# Patient Record
Sex: FEMALE | Race: BLACK OR AFRICAN AMERICAN | NOT HISPANIC OR LATINO | URBAN - METROPOLITAN AREA
[De-identification: names, ages, dates, MRNs, and addresses within clinical notes are randomized per-mention and may not be internally consistent; named-entity substitution may affect disease eponyms.]

---

## 2019-01-01 ENCOUNTER — INPATIENT (INPATIENT)
Facility: HOSPITAL | Age: 84
LOS: 3 days | Discharge: HOSPICE | DRG: 208 | End: 2019-06-11
Attending: HOSPITALIST | Admitting: INTERNAL MEDICINE
Payer: MEDICARE

## 2019-01-01 ENCOUNTER — INPATIENT (INPATIENT)
Facility: HOSPITAL | Age: 84
LOS: 3 days | DRG: 640 | End: 2019-07-04
Attending: INTERNAL MEDICINE | Admitting: HOSPITALIST
Payer: OTHER MISCELLANEOUS

## 2019-01-01 VITALS
RESPIRATION RATE: 18 BRPM | SYSTOLIC BLOOD PRESSURE: 111 MMHG | OXYGEN SATURATION: 92 % | TEMPERATURE: 98 F | HEART RATE: 88 BPM | DIASTOLIC BLOOD PRESSURE: 73 MMHG

## 2019-01-01 VITALS
DIASTOLIC BLOOD PRESSURE: 70 MMHG | HEART RATE: 60 BPM | OXYGEN SATURATION: 100 % | RESPIRATION RATE: 18 BRPM | TEMPERATURE: 98 F | SYSTOLIC BLOOD PRESSURE: 110 MMHG

## 2019-01-01 VITALS
RESPIRATION RATE: 32 BRPM | OXYGEN SATURATION: 60 % | HEART RATE: 67 BPM | SYSTOLIC BLOOD PRESSURE: 105 MMHG | DIASTOLIC BLOOD PRESSURE: 59 MMHG

## 2019-01-01 VITALS
DIASTOLIC BLOOD PRESSURE: 54 MMHG | TEMPERATURE: 98 F | SYSTOLIC BLOOD PRESSURE: 96 MMHG | RESPIRATION RATE: 12 BRPM | HEART RATE: 83 BPM

## 2019-01-01 DIAGNOSIS — J96.01 ACUTE RESPIRATORY FAILURE WITH HYPOXIA: ICD-10-CM

## 2019-01-01 DIAGNOSIS — Z86.73 PERSONAL HISTORY OF TRANSIENT ISCHEMIC ATTACK (TIA), AND CEREBRAL INFARCTION WITHOUT RESIDUAL DEFICITS: ICD-10-CM

## 2019-01-01 DIAGNOSIS — R64 CACHEXIA: ICD-10-CM

## 2019-01-01 DIAGNOSIS — E87.0 HYPEROSMOLALITY AND HYPERNATREMIA: ICD-10-CM

## 2019-01-01 DIAGNOSIS — Z71.89 OTHER SPECIFIED COUNSELING: ICD-10-CM

## 2019-01-01 DIAGNOSIS — J69.0 PNEUMONITIS DUE TO INHALATION OF FOOD AND VOMIT: ICD-10-CM

## 2019-01-01 DIAGNOSIS — R13.10 DYSPHAGIA, UNSPECIFIED: ICD-10-CM

## 2019-01-01 DIAGNOSIS — J18.9 PNEUMONIA, UNSPECIFIED ORGANISM: ICD-10-CM

## 2019-01-01 DIAGNOSIS — I95.9 HYPOTENSION, UNSPECIFIED: ICD-10-CM

## 2019-01-01 DIAGNOSIS — N17.9 ACUTE KIDNEY FAILURE, UNSPECIFIED: ICD-10-CM

## 2019-01-01 LAB
-  AMIKACIN: SIGNIFICANT CHANGE UP
-  AMPICILLIN/SULBACTAM: SIGNIFICANT CHANGE UP
-  AMPICILLIN: SIGNIFICANT CHANGE UP
-  AZTREONAM: SIGNIFICANT CHANGE UP
-  CEFAZOLIN: SIGNIFICANT CHANGE UP
-  CEFEPIME: SIGNIFICANT CHANGE UP
-  CEFOXITIN: SIGNIFICANT CHANGE UP
-  CEFTRIAXONE: SIGNIFICANT CHANGE UP
-  CIPROFLOXACIN: SIGNIFICANT CHANGE UP
-  ERTAPENEM: SIGNIFICANT CHANGE UP
-  GENTAMICIN: SIGNIFICANT CHANGE UP
-  IMIPENEM: SIGNIFICANT CHANGE UP
-  LEVOFLOXACIN: SIGNIFICANT CHANGE UP
-  MEROPENEM: SIGNIFICANT CHANGE UP
-  PIPERACILLIN/TAZOBACTAM: SIGNIFICANT CHANGE UP
-  TOBRAMYCIN: SIGNIFICANT CHANGE UP
-  TRIMETHOPRIM/SULFAMETHOXAZOLE: SIGNIFICANT CHANGE UP
ALBUMIN SERPL ELPH-MCNC: 2.2 G/DL — LOW (ref 3.3–5.2)
ALBUMIN SERPL ELPH-MCNC: 2.3 G/DL — LOW (ref 3.3–5.2)
ALBUMIN SERPL ELPH-MCNC: 3.3 G/DL — SIGNIFICANT CHANGE UP (ref 3.3–5.2)
ALP SERPL-CCNC: 52 U/L — SIGNIFICANT CHANGE UP (ref 40–120)
ALP SERPL-CCNC: 72 U/L — SIGNIFICANT CHANGE UP (ref 40–120)
ALP SERPL-CCNC: 85 U/L — SIGNIFICANT CHANGE UP (ref 40–120)
ALT FLD-CCNC: 11 U/L — SIGNIFICANT CHANGE UP
ALT FLD-CCNC: 11 U/L — SIGNIFICANT CHANGE UP
ALT FLD-CCNC: 7 U/L — SIGNIFICANT CHANGE UP
ANION GAP SERPL CALC-SCNC: 12 MMOL/L — SIGNIFICANT CHANGE UP (ref 5–17)
ANION GAP SERPL CALC-SCNC: 13 MMOL/L — SIGNIFICANT CHANGE UP (ref 5–17)
ANION GAP SERPL CALC-SCNC: 16 MMOL/L — SIGNIFICANT CHANGE UP (ref 5–17)
ANION GAP SERPL CALC-SCNC: 17 MMOL/L — SIGNIFICANT CHANGE UP (ref 5–17)
ANION GAP SERPL CALC-SCNC: 17 MMOL/L — SIGNIFICANT CHANGE UP (ref 5–17)
ANION GAP SERPL CALC-SCNC: 23 MMOL/L — HIGH (ref 5–17)
APTT BLD: 23.5 SEC — LOW (ref 27.5–36.3)
AST SERPL-CCNC: 16 U/L — SIGNIFICANT CHANGE UP
AST SERPL-CCNC: 20 U/L — SIGNIFICANT CHANGE UP
AST SERPL-CCNC: 20 U/L — SIGNIFICANT CHANGE UP
BASOPHILS # BLD AUTO: 0 K/UL — SIGNIFICANT CHANGE UP (ref 0–0.2)
BASOPHILS NFR BLD AUTO: 0.1 % — SIGNIFICANT CHANGE UP (ref 0–2)
BILIRUB SERPL-MCNC: 0.5 MG/DL — SIGNIFICANT CHANGE UP (ref 0.4–2)
BILIRUB SERPL-MCNC: 0.7 MG/DL — SIGNIFICANT CHANGE UP (ref 0.4–2)
BILIRUB SERPL-MCNC: 1.1 MG/DL — SIGNIFICANT CHANGE UP (ref 0.4–2)
BUN SERPL-MCNC: 17 MG/DL — SIGNIFICANT CHANGE UP (ref 8–20)
BUN SERPL-MCNC: 17 MG/DL — SIGNIFICANT CHANGE UP (ref 8–20)
BUN SERPL-MCNC: 19 MG/DL — SIGNIFICANT CHANGE UP (ref 8–20)
BUN SERPL-MCNC: 29 MG/DL — HIGH (ref 8–20)
BUN SERPL-MCNC: 30 MG/DL — HIGH (ref 8–20)
BUN SERPL-MCNC: 34 MG/DL — HIGH (ref 8–20)
BUN SERPL-MCNC: 36 MG/DL — HIGH (ref 8–20)
BUN SERPL-MCNC: 44 MG/DL — HIGH (ref 8–20)
CALCIUM SERPL-MCNC: 6.9 MG/DL — LOW (ref 8.6–10.2)
CALCIUM SERPL-MCNC: 6.9 MG/DL — LOW (ref 8.6–10.2)
CALCIUM SERPL-MCNC: 7.1 MG/DL — LOW (ref 8.6–10.2)
CALCIUM SERPL-MCNC: 7.4 MG/DL — LOW (ref 8.6–10.2)
CALCIUM SERPL-MCNC: 7.4 MG/DL — LOW (ref 8.6–10.2)
CALCIUM SERPL-MCNC: 7.6 MG/DL — LOW (ref 8.6–10.2)
CALCIUM SERPL-MCNC: 7.8 MG/DL — LOW (ref 8.6–10.2)
CALCIUM SERPL-MCNC: 9 MG/DL — SIGNIFICANT CHANGE UP (ref 8.6–10.2)
CHLORIDE SERPL-SCNC: 104 MMOL/L — SIGNIFICANT CHANGE UP (ref 98–107)
CHLORIDE SERPL-SCNC: 106 MMOL/L — SIGNIFICANT CHANGE UP (ref 98–107)
CHLORIDE SERPL-SCNC: 109 MMOL/L — HIGH (ref 98–107)
CHLORIDE SERPL-SCNC: 110 MMOL/L — HIGH (ref 98–107)
CHLORIDE SERPL-SCNC: 112 MMOL/L — HIGH (ref 98–107)
CHLORIDE SERPL-SCNC: 112 MMOL/L — HIGH (ref 98–107)
CHLORIDE SERPL-SCNC: 114 MMOL/L — HIGH (ref 98–107)
CHLORIDE SERPL-SCNC: 118 MMOL/L — HIGH (ref 98–107)
CK SERPL-CCNC: 91 U/L — SIGNIFICANT CHANGE UP (ref 25–170)
CO2 SERPL-SCNC: 22 MMOL/L — SIGNIFICANT CHANGE UP (ref 22–29)
CO2 SERPL-SCNC: 23 MMOL/L — SIGNIFICANT CHANGE UP (ref 22–29)
CO2 SERPL-SCNC: 24 MMOL/L — SIGNIFICANT CHANGE UP (ref 22–29)
CO2 SERPL-SCNC: 24 MMOL/L — SIGNIFICANT CHANGE UP (ref 22–29)
CO2 SERPL-SCNC: 25 MMOL/L — SIGNIFICANT CHANGE UP (ref 22–29)
CO2 SERPL-SCNC: 27 MMOL/L — SIGNIFICANT CHANGE UP (ref 22–29)
CO2 SERPL-SCNC: 30 MMOL/L — HIGH (ref 22–29)
CO2 SERPL-SCNC: 31 MMOL/L — HIGH (ref 22–29)
CREAT SERPL-MCNC: 0.87 MG/DL — SIGNIFICANT CHANGE UP (ref 0.5–1.3)
CREAT SERPL-MCNC: 0.89 MG/DL — SIGNIFICANT CHANGE UP (ref 0.5–1.3)
CREAT SERPL-MCNC: 0.91 MG/DL — SIGNIFICANT CHANGE UP (ref 0.5–1.3)
CREAT SERPL-MCNC: 1.1 MG/DL — SIGNIFICANT CHANGE UP (ref 0.5–1.3)
CREAT SERPL-MCNC: 1.21 MG/DL — SIGNIFICANT CHANGE UP (ref 0.5–1.3)
CREAT SERPL-MCNC: 1.4 MG/DL — HIGH (ref 0.5–1.3)
CREAT SERPL-MCNC: 1.43 MG/DL — HIGH (ref 0.5–1.3)
CREAT SERPL-MCNC: 1.9 MG/DL — HIGH (ref 0.5–1.3)
CULTURE RESULTS: SIGNIFICANT CHANGE UP
EOSINOPHIL # BLD AUTO: 0 K/UL — SIGNIFICANT CHANGE UP (ref 0–0.5)
EOSINOPHIL NFR BLD AUTO: 0 % — SIGNIFICANT CHANGE UP (ref 0–6)
GAS PNL BLDA: SIGNIFICANT CHANGE UP
GAS PNL BLDV: SIGNIFICANT CHANGE UP
GLUCOSE BLDC GLUCOMTR-MCNC: 110 MG/DL — HIGH (ref 70–99)
GLUCOSE SERPL-MCNC: 130 MG/DL — HIGH (ref 70–115)
GLUCOSE SERPL-MCNC: 132 MG/DL — HIGH (ref 70–115)
GLUCOSE SERPL-MCNC: 133 MG/DL — HIGH (ref 70–115)
GLUCOSE SERPL-MCNC: 136 MG/DL — HIGH (ref 70–115)
GLUCOSE SERPL-MCNC: 160 MG/DL — HIGH (ref 70–115)
GLUCOSE SERPL-MCNC: 177 MG/DL — HIGH (ref 70–115)
GLUCOSE SERPL-MCNC: 217 MG/DL — HIGH (ref 70–115)
GLUCOSE SERPL-MCNC: 76 MG/DL — SIGNIFICANT CHANGE UP (ref 70–115)
GRAM STN FLD: SIGNIFICANT CHANGE UP
HCT VFR BLD CALC: 29.3 % — LOW (ref 37–47)
HCT VFR BLD CALC: 30.2 % — LOW (ref 37–47)
HCT VFR BLD CALC: 31.1 % — LOW (ref 34.5–45)
HCT VFR BLD CALC: 33.8 % — LOW (ref 34.5–45)
HCT VFR BLD CALC: 41.5 % — SIGNIFICANT CHANGE UP (ref 37–47)
HGB BLD-MCNC: 10.4 G/DL — LOW (ref 11.5–15.5)
HGB BLD-MCNC: 12.9 G/DL — SIGNIFICANT CHANGE UP (ref 12–16)
HGB BLD-MCNC: 9.5 G/DL — LOW (ref 12–16)
HGB BLD-MCNC: 9.7 G/DL — LOW (ref 12–16)
HGB BLD-MCNC: 9.9 G/DL — LOW (ref 11.5–15.5)
INR BLD: 1.12 RATIO — SIGNIFICANT CHANGE UP (ref 0.88–1.16)
LACTATE BLDV-MCNC: 3.3 MMOL/L — HIGH (ref 0.5–2)
LACTATE SERPL-SCNC: 3.6 MMOL/L — HIGH (ref 0.5–2)
LYMPHOCYTES # BLD AUTO: 1.3 K/UL — SIGNIFICANT CHANGE UP (ref 1–4.8)
LYMPHOCYTES # BLD AUTO: 11.4 % — LOW (ref 20–55)
MAGNESIUM SERPL-MCNC: 1.7 MG/DL — SIGNIFICANT CHANGE UP (ref 1.6–2.6)
MAGNESIUM SERPL-MCNC: 1.9 MG/DL — SIGNIFICANT CHANGE UP (ref 1.6–2.6)
MAGNESIUM SERPL-MCNC: 2 MG/DL — SIGNIFICANT CHANGE UP (ref 1.6–2.6)
MAGNESIUM SERPL-MCNC: 2 MG/DL — SIGNIFICANT CHANGE UP (ref 1.6–2.6)
MAGNESIUM SERPL-MCNC: 2 MG/DL — SIGNIFICANT CHANGE UP (ref 1.8–2.6)
MCHC RBC-ENTMCNC: 30.1 PG — SIGNIFICANT CHANGE UP (ref 27–31)
MCHC RBC-ENTMCNC: 30.2 PG — SIGNIFICANT CHANGE UP (ref 27–31)
MCHC RBC-ENTMCNC: 30.3 PG — SIGNIFICANT CHANGE UP (ref 27–31)
MCHC RBC-ENTMCNC: 30.3 PG — SIGNIFICANT CHANGE UP (ref 27–34)
MCHC RBC-ENTMCNC: 30.8 GM/DL — LOW (ref 32–36)
MCHC RBC-ENTMCNC: 30.8 PG — SIGNIFICANT CHANGE UP (ref 27–34)
MCHC RBC-ENTMCNC: 31.1 G/DL — LOW (ref 32–36)
MCHC RBC-ENTMCNC: 31.8 GM/DL — LOW (ref 32–36)
MCHC RBC-ENTMCNC: 32.1 G/DL — SIGNIFICANT CHANGE UP (ref 32–36)
MCHC RBC-ENTMCNC: 32.4 G/DL — SIGNIFICANT CHANGE UP (ref 32–36)
MCV RBC AUTO: 93 FL — SIGNIFICANT CHANGE UP (ref 81–99)
MCV RBC AUTO: 93.8 FL — SIGNIFICANT CHANGE UP (ref 81–99)
MCV RBC AUTO: 96.9 FL — SIGNIFICANT CHANGE UP (ref 80–100)
MCV RBC AUTO: 97.4 FL — SIGNIFICANT CHANGE UP (ref 81–99)
MCV RBC AUTO: 98.5 FL — SIGNIFICANT CHANGE UP (ref 80–100)
METHOD TYPE: SIGNIFICANT CHANGE UP
MONOCYTES # BLD AUTO: 1.1 K/UL — HIGH (ref 0–0.8)
MONOCYTES NFR BLD AUTO: 9 % — SIGNIFICANT CHANGE UP (ref 3–10)
NEUTROPHILS # BLD AUTO: 9.4 K/UL — HIGH (ref 1.8–8)
NEUTROPHILS NFR BLD AUTO: 79.2 % — HIGH (ref 37–73)
ORGANISM # SPEC MICROSCOPIC CNT: SIGNIFICANT CHANGE UP
ORGANISM # SPEC MICROSCOPIC CNT: SIGNIFICANT CHANGE UP
PHOSPHATE SERPL-MCNC: 0.8 MG/DL — CRITICAL LOW (ref 2.4–4.7)
PHOSPHATE SERPL-MCNC: 1.1 MG/DL — LOW (ref 2.4–4.7)
PHOSPHATE SERPL-MCNC: 4.5 MG/DL — SIGNIFICANT CHANGE UP (ref 2.4–4.7)
PLATELET # BLD AUTO: 174 K/UL — SIGNIFICANT CHANGE UP (ref 150–400)
PLATELET # BLD AUTO: 180 K/UL — SIGNIFICANT CHANGE UP (ref 150–400)
PLATELET # BLD AUTO: 196 K/UL — SIGNIFICANT CHANGE UP (ref 150–400)
PLATELET # BLD AUTO: 217 K/UL — SIGNIFICANT CHANGE UP (ref 150–400)
PLATELET # BLD AUTO: 244 K/UL — SIGNIFICANT CHANGE UP (ref 150–400)
POTASSIUM SERPL-MCNC: 2.7 MMOL/L — CRITICAL LOW (ref 3.5–5.3)
POTASSIUM SERPL-MCNC: 2.7 MMOL/L — CRITICAL LOW (ref 3.5–5.3)
POTASSIUM SERPL-MCNC: 2.8 MMOL/L — CRITICAL LOW (ref 3.5–5.3)
POTASSIUM SERPL-MCNC: 3.1 MMOL/L — LOW (ref 3.5–5.3)
POTASSIUM SERPL-MCNC: 3.2 MMOL/L — LOW (ref 3.5–5.3)
POTASSIUM SERPL-MCNC: 3.4 MMOL/L — LOW (ref 3.5–5.3)
POTASSIUM SERPL-MCNC: 3.6 MMOL/L — SIGNIFICANT CHANGE UP (ref 3.5–5.3)
POTASSIUM SERPL-MCNC: 4 MMOL/L — SIGNIFICANT CHANGE UP (ref 3.5–5.3)
POTASSIUM SERPL-SCNC: 2.7 MMOL/L — CRITICAL LOW (ref 3.5–5.3)
POTASSIUM SERPL-SCNC: 2.7 MMOL/L — CRITICAL LOW (ref 3.5–5.3)
POTASSIUM SERPL-SCNC: 2.8 MMOL/L — CRITICAL LOW (ref 3.5–5.3)
POTASSIUM SERPL-SCNC: 3.1 MMOL/L — LOW (ref 3.5–5.3)
POTASSIUM SERPL-SCNC: 3.2 MMOL/L — LOW (ref 3.5–5.3)
POTASSIUM SERPL-SCNC: 3.4 MMOL/L — LOW (ref 3.5–5.3)
POTASSIUM SERPL-SCNC: 3.6 MMOL/L — SIGNIFICANT CHANGE UP (ref 3.5–5.3)
POTASSIUM SERPL-SCNC: 4 MMOL/L — SIGNIFICANT CHANGE UP (ref 3.5–5.3)
PROT SERPL-MCNC: 4.5 G/DL — LOW (ref 6.6–8.7)
PROT SERPL-MCNC: 5.8 G/DL — LOW (ref 6.6–8.7)
PROT SERPL-MCNC: 6.4 G/DL — LOW (ref 6.6–8.7)
PROTHROM AB SERPL-ACNC: 12.9 SEC — SIGNIFICANT CHANGE UP (ref 10–12.9)
RBC # BLD: 3.15 M/UL — LOW (ref 4.4–5.2)
RBC # BLD: 3.21 M/UL — LOW (ref 3.8–5.2)
RBC # BLD: 3.22 M/UL — LOW (ref 4.4–5.2)
RBC # BLD: 3.43 M/UL — LOW (ref 3.8–5.2)
RBC # BLD: 4.26 M/UL — LOW (ref 4.4–5.2)
RBC # FLD: 16.6 % — HIGH (ref 11–15.6)
RBC # FLD: 16.9 % — HIGH (ref 11–15.6)
RBC # FLD: 17.1 % — HIGH (ref 10.3–14.5)
RBC # FLD: 17.4 % — HIGH (ref 11–15.6)
RBC # FLD: 17.7 % — HIGH (ref 10.3–14.5)
SODIUM SERPL-SCNC: 144 MMOL/L — SIGNIFICANT CHANGE UP (ref 135–145)
SODIUM SERPL-SCNC: 144 MMOL/L — SIGNIFICANT CHANGE UP (ref 135–145)
SODIUM SERPL-SCNC: 150 MMOL/L — HIGH (ref 135–145)
SODIUM SERPL-SCNC: 151 MMOL/L — HIGH (ref 135–145)
SODIUM SERPL-SCNC: 155 MMOL/L — HIGH (ref 135–145)
SODIUM SERPL-SCNC: 157 MMOL/L — HIGH (ref 135–145)
SODIUM SERPL-SCNC: 158 MMOL/L — HIGH (ref 135–145)
SODIUM SERPL-SCNC: 162 MMOL/L — CRITICAL HIGH (ref 135–145)
SPECIMEN SOURCE: SIGNIFICANT CHANGE UP
TROPONIN T SERPL-MCNC: 0.08 NG/ML — HIGH (ref 0–0.06)
TROPONIN T SERPL-MCNC: 0.09 NG/ML — HIGH (ref 0–0.06)
WBC # BLD: 10.7 K/UL — SIGNIFICANT CHANGE UP (ref 4.8–10.8)
WBC # BLD: 11.8 K/UL — HIGH (ref 4.8–10.8)
WBC # BLD: 12.17 K/UL — HIGH (ref 3.8–10.5)
WBC # BLD: 13.36 K/UL — HIGH (ref 3.8–10.5)
WBC # BLD: 7.6 K/UL — SIGNIFICANT CHANGE UP (ref 4.8–10.8)
WBC # FLD AUTO: 10.7 K/UL — SIGNIFICANT CHANGE UP (ref 4.8–10.8)
WBC # FLD AUTO: 11.8 K/UL — HIGH (ref 4.8–10.8)
WBC # FLD AUTO: 12.17 K/UL — HIGH (ref 3.8–10.5)
WBC # FLD AUTO: 13.36 K/UL — HIGH (ref 3.8–10.5)
WBC # FLD AUTO: 7.6 K/UL — SIGNIFICANT CHANGE UP (ref 4.8–10.8)

## 2019-01-01 PROCEDURE — 70450 CT HEAD/BRAIN W/O DYE: CPT | Mod: 26

## 2019-01-01 PROCEDURE — 92526 ORAL FUNCTION THERAPY: CPT

## 2019-01-01 PROCEDURE — 85730 THROMBOPLASTIN TIME PARTIAL: CPT

## 2019-01-01 PROCEDURE — 71045 X-RAY EXAM CHEST 1 VIEW: CPT | Mod: 26

## 2019-01-01 PROCEDURE — 84100 ASSAY OF PHOSPHORUS: CPT

## 2019-01-01 PROCEDURE — 82962 GLUCOSE BLOOD TEST: CPT

## 2019-01-01 PROCEDURE — 85014 HEMATOCRIT: CPT

## 2019-01-01 PROCEDURE — 36415 COLL VENOUS BLD VENIPUNCTURE: CPT

## 2019-01-01 PROCEDURE — 82330 ASSAY OF CALCIUM: CPT

## 2019-01-01 PROCEDURE — 82803 BLOOD GASES ANY COMBINATION: CPT

## 2019-01-01 PROCEDURE — 82435 ASSAY OF BLOOD CHLORIDE: CPT

## 2019-01-01 PROCEDURE — 99233 SBSQ HOSP IP/OBS HIGH 50: CPT

## 2019-01-01 PROCEDURE — 43753 TX GASTRO INTUB W/ASP: CPT

## 2019-01-01 PROCEDURE — 93010 ELECTROCARDIOGRAM REPORT: CPT

## 2019-01-01 PROCEDURE — 85027 COMPLETE CBC AUTOMATED: CPT

## 2019-01-01 PROCEDURE — 87070 CULTURE OTHR SPECIMN AEROBIC: CPT

## 2019-01-01 PROCEDURE — 99291 CRITICAL CARE FIRST HOUR: CPT | Mod: 25

## 2019-01-01 PROCEDURE — 94003 VENT MGMT INPAT SUBQ DAY: CPT

## 2019-01-01 PROCEDURE — 80053 COMPREHEN METABOLIC PANEL: CPT

## 2019-01-01 PROCEDURE — 99232 SBSQ HOSP IP/OBS MODERATE 35: CPT

## 2019-01-01 PROCEDURE — 82550 ASSAY OF CK (CPK): CPT

## 2019-01-01 PROCEDURE — 93005 ELECTROCARDIOGRAM TRACING: CPT

## 2019-01-01 PROCEDURE — 71045 X-RAY EXAM CHEST 1 VIEW: CPT

## 2019-01-01 PROCEDURE — 96365 THER/PROPH/DIAG IV INF INIT: CPT | Mod: XU

## 2019-01-01 PROCEDURE — 96374 THER/PROPH/DIAG INJ IV PUSH: CPT

## 2019-01-01 PROCEDURE — 99223 1ST HOSP IP/OBS HIGH 75: CPT

## 2019-01-01 PROCEDURE — 99497 ADVNCD CARE PLAN 30 MIN: CPT | Mod: GC

## 2019-01-01 PROCEDURE — 84484 ASSAY OF TROPONIN QUANT: CPT

## 2019-01-01 PROCEDURE — 99285 EMERGENCY DEPT VISIT HI MDM: CPT | Mod: 25

## 2019-01-01 PROCEDURE — 99232 SBSQ HOSP IP/OBS MODERATE 35: CPT | Mod: GC

## 2019-01-01 PROCEDURE — 31500 INSERT EMERGENCY AIRWAY: CPT

## 2019-01-01 PROCEDURE — 84132 ASSAY OF SERUM POTASSIUM: CPT

## 2019-01-01 PROCEDURE — 96376 TX/PRO/DX INJ SAME DRUG ADON: CPT

## 2019-01-01 PROCEDURE — 82947 ASSAY GLUCOSE BLOOD QUANT: CPT

## 2019-01-01 PROCEDURE — 99291 CRITICAL CARE FIRST HOUR: CPT

## 2019-01-01 PROCEDURE — 83735 ASSAY OF MAGNESIUM: CPT

## 2019-01-01 PROCEDURE — 70450 CT HEAD/BRAIN W/O DYE: CPT

## 2019-01-01 PROCEDURE — 84295 ASSAY OF SERUM SODIUM: CPT

## 2019-01-01 PROCEDURE — 99239 HOSP IP/OBS DSCHRG MGMT >30: CPT

## 2019-01-01 PROCEDURE — 80048 BASIC METABOLIC PNL TOTAL CA: CPT

## 2019-01-01 PROCEDURE — 85610 PROTHROMBIN TIME: CPT

## 2019-01-01 PROCEDURE — 12345: CPT | Mod: NC

## 2019-01-01 PROCEDURE — 99285 EMERGENCY DEPT VISIT HI MDM: CPT

## 2019-01-01 PROCEDURE — 87186 SC STD MICRODIL/AGAR DIL: CPT

## 2019-01-01 PROCEDURE — 94002 VENT MGMT INPAT INIT DAY: CPT

## 2019-01-01 PROCEDURE — 96375 TX/PRO/DX INJ NEW DRUG ADDON: CPT

## 2019-01-01 PROCEDURE — 96375 TX/PRO/DX INJ NEW DRUG ADDON: CPT | Mod: XU

## 2019-01-01 PROCEDURE — 99238 HOSP IP/OBS DSCHRG MGMT 30/<: CPT

## 2019-01-01 PROCEDURE — 94760 N-INVAS EAR/PLS OXIMETRY 1: CPT

## 2019-01-01 PROCEDURE — 83605 ASSAY OF LACTIC ACID: CPT

## 2019-01-01 PROCEDURE — 92610 EVALUATE SWALLOWING FUNCTION: CPT

## 2019-01-01 PROCEDURE — 94770: CPT

## 2019-01-01 PROCEDURE — 99053 MED SERV 10PM-8AM 24 HR FAC: CPT

## 2019-01-01 PROCEDURE — 87040 BLOOD CULTURE FOR BACTERIA: CPT

## 2019-01-01 PROCEDURE — 96368 THER/DIAG CONCURRENT INF: CPT

## 2019-01-01 RX ORDER — ROBINUL 0.2 MG/ML
2.5 INJECTION INTRAMUSCULAR; INTRAVENOUS
Qty: 225 | Refills: 0
Start: 2019-01-01 | End: 2019-07-30

## 2019-01-01 RX ORDER — AMLODIPINE BESYLATE 2.5 MG/1
1 TABLET ORAL
Qty: 0 | Refills: 0 | DISCHARGE

## 2019-01-01 RX ORDER — ASPIRIN/CALCIUM CARB/MAGNESIUM 324 MG
1 TABLET ORAL
Qty: 0 | Refills: 0 | DISCHARGE

## 2019-01-01 RX ORDER — SODIUM CHLORIDE 9 MG/ML
1000 INJECTION, SOLUTION INTRAVENOUS ONCE
Refills: 0 | Status: COMPLETED | OUTPATIENT
Start: 2019-01-01 | End: 2019-01-01

## 2019-01-01 RX ORDER — MORPHINE SULFATE 50 MG/1
2.5 CAPSULE, EXTENDED RELEASE ORAL EVERY 8 HOURS
Refills: 0 | Status: DISCONTINUED | OUTPATIENT
Start: 2019-01-01 | End: 2019-01-01

## 2019-01-01 RX ORDER — PIPERACILLIN AND TAZOBACTAM 4; .5 G/20ML; G/20ML
3.38 INJECTION, POWDER, LYOPHILIZED, FOR SOLUTION INTRAVENOUS
Qty: 0 | Refills: 0 | DISCHARGE
Start: 2019-01-01

## 2019-01-01 RX ORDER — POTASSIUM CHLORIDE 20 MEQ
10 PACKET (EA) ORAL ONCE
Refills: 0 | Status: COMPLETED | OUTPATIENT
Start: 2019-01-01 | End: 2019-01-01

## 2019-01-01 RX ORDER — POTASSIUM PHOSPHATE, MONOBASIC POTASSIUM PHOSPHATE, DIBASIC 236; 224 MG/ML; MG/ML
30 INJECTION, SOLUTION INTRAVENOUS ONCE
Refills: 0 | Status: COMPLETED | OUTPATIENT
Start: 2019-01-01 | End: 2019-01-01

## 2019-01-01 RX ORDER — ASPIRIN/CALCIUM CARB/MAGNESIUM 324 MG
81 TABLET ORAL DAILY
Refills: 0 | Status: DISCONTINUED | OUTPATIENT
Start: 2019-01-01 | End: 2019-01-01

## 2019-01-01 RX ORDER — VANCOMYCIN HCL 1 G
1000 VIAL (EA) INTRAVENOUS ONCE
Refills: 0 | Status: COMPLETED | OUTPATIENT
Start: 2019-01-01 | End: 2019-01-01

## 2019-01-01 RX ORDER — PIPERACILLIN AND TAZOBACTAM 4; .5 G/20ML; G/20ML
3.38 INJECTION, POWDER, LYOPHILIZED, FOR SOLUTION INTRAVENOUS ONCE
Refills: 0 | Status: COMPLETED | OUTPATIENT
Start: 2019-01-01 | End: 2019-01-01

## 2019-01-01 RX ORDER — MORPHINE SULFATE 50 MG/1
2 CAPSULE, EXTENDED RELEASE ORAL
Refills: 0 | Status: DISCONTINUED | OUTPATIENT
Start: 2019-01-01 | End: 2019-01-01

## 2019-01-01 RX ORDER — HEPARIN SODIUM 5000 [USP'U]/ML
5000 INJECTION INTRAVENOUS; SUBCUTANEOUS EVERY 8 HOURS
Refills: 0 | Status: DISCONTINUED | OUTPATIENT
Start: 2019-01-01 | End: 2019-01-01

## 2019-01-01 RX ORDER — ACETAMINOPHEN 500 MG
1 TABLET ORAL
Qty: 0 | Refills: 0 | DISCHARGE
Start: 2019-01-01

## 2019-01-01 RX ORDER — DEXTROSE MONOHYDRATE, SODIUM CHLORIDE, AND POTASSIUM CHLORIDE 50; .745; 4.5 G/1000ML; G/1000ML; G/1000ML
1000 INJECTION, SOLUTION INTRAVENOUS
Refills: 0 | Status: DISCONTINUED | OUTPATIENT
Start: 2019-01-01 | End: 2019-01-01

## 2019-01-01 RX ORDER — PROPOFOL 10 MG/ML
10 INJECTION, EMULSION INTRAVENOUS
Qty: 1000 | Refills: 0 | Status: DISCONTINUED | OUTPATIENT
Start: 2019-01-01 | End: 2019-01-01

## 2019-01-01 RX ORDER — CHLORHEXIDINE GLUCONATE 213 G/1000ML
1 SOLUTION TOPICAL
Refills: 0 | Status: DISCONTINUED | OUTPATIENT
Start: 2019-01-01 | End: 2019-01-01

## 2019-01-01 RX ORDER — LEVOTHYROXINE SODIUM 125 MCG
12.5 TABLET ORAL
Qty: 0 | Refills: 0 | DISCHARGE
Start: 2019-01-01

## 2019-01-01 RX ORDER — SCOPALAMINE 1 MG/3D
1 PATCH, EXTENDED RELEASE TRANSDERMAL
Refills: 0 | Status: DISCONTINUED | OUTPATIENT
Start: 2019-01-01 | End: 2019-01-01

## 2019-01-01 RX ORDER — LEVOTHYROXINE SODIUM 125 MCG
12.5 TABLET ORAL AT BEDTIME
Refills: 0 | Status: DISCONTINUED | OUTPATIENT
Start: 2019-01-01 | End: 2019-01-01

## 2019-01-01 RX ORDER — MORPHINE SULFATE 50 MG/1
0.25 CAPSULE, EXTENDED RELEASE ORAL
Qty: 22.5 | Refills: 0
Start: 2019-01-01 | End: 2019-07-30

## 2019-01-01 RX ORDER — SODIUM CHLORIDE 9 MG/ML
1000 INJECTION, SOLUTION INTRAVENOUS
Refills: 0 | Status: DISCONTINUED | OUTPATIENT
Start: 2019-01-01 | End: 2019-01-01

## 2019-01-01 RX ORDER — PIPERACILLIN AND TAZOBACTAM 4; .5 G/20ML; G/20ML
3.38 INJECTION, POWDER, LYOPHILIZED, FOR SOLUTION INTRAVENOUS EVERY 12 HOURS
Refills: 0 | Status: DISCONTINUED | OUTPATIENT
Start: 2019-01-01 | End: 2019-01-01

## 2019-01-01 RX ORDER — ACETAMINOPHEN 500 MG
325 TABLET ORAL EVERY 6 HOURS
Refills: 0 | Status: DISCONTINUED | OUTPATIENT
Start: 2019-01-01 | End: 2019-01-01

## 2019-01-01 RX ORDER — POTASSIUM PHOSPHATE, MONOBASIC POTASSIUM PHOSPHATE, DIBASIC 236; 224 MG/ML; MG/ML
30 INJECTION, SOLUTION INTRAVENOUS EVERY 12 HOURS
Refills: 0 | Status: COMPLETED | OUTPATIENT
Start: 2019-01-01 | End: 2019-01-01

## 2019-01-01 RX ORDER — MORPHINE SULFATE 50 MG/1
2 CAPSULE, EXTENDED RELEASE ORAL EVERY 4 HOURS
Refills: 0 | Status: DISCONTINUED | OUTPATIENT
Start: 2019-01-01 | End: 2019-01-01

## 2019-01-01 RX ORDER — ROBINUL 0.2 MG/ML
0.4 INJECTION INTRAMUSCULAR; INTRAVENOUS EVERY 8 HOURS
Refills: 0 | Status: DISCONTINUED | OUTPATIENT
Start: 2019-01-01 | End: 2019-01-01

## 2019-01-01 RX ORDER — ETOMIDATE 2 MG/ML
20 INJECTION INTRAVENOUS ONCE
Refills: 0 | Status: COMPLETED | OUTPATIENT
Start: 2019-01-01 | End: 2019-01-01

## 2019-01-01 RX ORDER — LEVOTHYROXINE SODIUM 125 MCG
25 TABLET ORAL DAILY
Refills: 0 | Status: DISCONTINUED | OUTPATIENT
Start: 2019-01-01 | End: 2019-01-01

## 2019-01-01 RX ORDER — POTASSIUM CHLORIDE 20 MEQ
20 PACKET (EA) ORAL ONCE
Refills: 0 | Status: DISCONTINUED | OUTPATIENT
Start: 2019-01-01 | End: 2019-01-01

## 2019-01-01 RX ORDER — FAMOTIDINE 10 MG/ML
20 INJECTION INTRAVENOUS DAILY
Refills: 0 | Status: DISCONTINUED | OUTPATIENT
Start: 2019-01-01 | End: 2019-01-01

## 2019-01-01 RX ORDER — MORPHINE SULFATE 50 MG/1
2 CAPSULE, EXTENDED RELEASE ORAL EVERY 6 HOURS
Refills: 0 | Status: DISCONTINUED | OUTPATIENT
Start: 2019-01-01 | End: 2019-01-01

## 2019-01-01 RX ORDER — CHLORHEXIDINE GLUCONATE 213 G/1000ML
15 SOLUTION TOPICAL
Refills: 0 | Status: DISCONTINUED | OUTPATIENT
Start: 2019-01-01 | End: 2019-01-01

## 2019-01-01 RX ORDER — ROCURONIUM BROMIDE 10 MG/ML
70 VIAL (ML) INTRAVENOUS ONCE
Refills: 0 | Status: COMPLETED | OUTPATIENT
Start: 2019-01-01 | End: 2019-01-01

## 2019-01-01 RX ORDER — DEXMEDETOMIDINE HYDROCHLORIDE IN 0.9% SODIUM CHLORIDE 4 UG/ML
0.2 INJECTION INTRAVENOUS
Qty: 200 | Refills: 0 | Status: DISCONTINUED | OUTPATIENT
Start: 2019-01-01 | End: 2019-01-01

## 2019-01-01 RX ORDER — DEXTROSE 50 % IN WATER 50 %
25 SYRINGE (ML) INTRAVENOUS ONCE
Refills: 0 | Status: COMPLETED | OUTPATIENT
Start: 2019-01-01 | End: 2019-01-01

## 2019-01-01 RX ORDER — POTASSIUM CHLORIDE 20 MEQ
10 PACKET (EA) ORAL
Refills: 0 | Status: COMPLETED | OUTPATIENT
Start: 2019-01-01 | End: 2019-01-01

## 2019-01-01 RX ORDER — METOPROLOL TARTRATE 50 MG
1 TABLET ORAL
Qty: 0 | Refills: 0 | DISCHARGE

## 2019-01-01 RX ORDER — ALENDRONATE SODIUM 70 MG/1
1 TABLET ORAL
Qty: 0 | Refills: 0 | DISCHARGE

## 2019-01-01 RX ORDER — MORPHINE SULFATE 50 MG/1
2 CAPSULE, EXTENDED RELEASE ORAL
Qty: 0 | Refills: 0 | DISCHARGE
Start: 2019-01-01

## 2019-01-01 RX ORDER — LEVOTHYROXINE SODIUM 125 MCG
1 TABLET ORAL
Qty: 0 | Refills: 0 | DISCHARGE

## 2019-01-01 RX ORDER — SODIUM CHLORIDE 9 MG/ML
500 INJECTION, SOLUTION INTRAVENOUS ONCE
Refills: 0 | Status: COMPLETED | OUTPATIENT
Start: 2019-01-01 | End: 2019-01-01

## 2019-01-01 RX ORDER — SCOPALAMINE 1 MG/3D
1 PATCH, EXTENDED RELEASE TRANSDERMAL
Qty: 0 | Refills: 0 | DISCHARGE
Start: 2019-01-01

## 2019-01-01 RX ORDER — SODIUM CHLORIDE 9 MG/ML
1450 INJECTION, SOLUTION INTRAVENOUS ONCE
Refills: 0 | Status: COMPLETED | OUTPATIENT
Start: 2019-01-01 | End: 2019-01-01

## 2019-01-01 RX ADMIN — CHLORHEXIDINE GLUCONATE 1 APPLICATION(S): 213 SOLUTION TOPICAL at 05:30

## 2019-01-01 RX ADMIN — ETOMIDATE 20 MILLIGRAM(S): 2 INJECTION INTRAVENOUS at 14:39

## 2019-01-01 RX ADMIN — CHLORHEXIDINE GLUCONATE 15 MILLILITER(S): 213 SOLUTION TOPICAL at 05:21

## 2019-01-01 RX ADMIN — ROBINUL 0.4 MILLIGRAM(S): 0.2 INJECTION INTRAMUSCULAR; INTRAVENOUS at 06:07

## 2019-01-01 RX ADMIN — HEPARIN SODIUM 5000 UNIT(S): 5000 INJECTION INTRAVENOUS; SUBCUTANEOUS at 22:59

## 2019-01-01 RX ADMIN — FAMOTIDINE 20 MILLIGRAM(S): 10 INJECTION INTRAVENOUS at 12:23

## 2019-01-01 RX ADMIN — POTASSIUM PHOSPHATE, MONOBASIC POTASSIUM PHOSPHATE, DIBASIC 83.33 MILLIMOLE(S): 236; 224 INJECTION, SOLUTION INTRAVENOUS at 15:31

## 2019-01-01 RX ADMIN — HEPARIN SODIUM 5000 UNIT(S): 5000 INJECTION INTRAVENOUS; SUBCUTANEOUS at 05:21

## 2019-01-01 RX ADMIN — Medication 81 MILLIGRAM(S): at 12:23

## 2019-01-01 RX ADMIN — Medication 250 MILLIGRAM(S): at 15:18

## 2019-01-01 RX ADMIN — CHLORHEXIDINE GLUCONATE 15 MILLILITER(S): 213 SOLUTION TOPICAL at 05:44

## 2019-01-01 RX ADMIN — POTASSIUM PHOSPHATE, MONOBASIC POTASSIUM PHOSPHATE, DIBASIC 83.33 MILLIMOLE(S): 236; 224 INJECTION, SOLUTION INTRAVENOUS at 19:54

## 2019-01-01 RX ADMIN — MORPHINE SULFATE 2 MILLIGRAM(S): 50 CAPSULE, EXTENDED RELEASE ORAL at 05:44

## 2019-01-01 RX ADMIN — ROBINUL 0.4 MILLIGRAM(S): 0.2 INJECTION INTRAMUSCULAR; INTRAVENOUS at 05:45

## 2019-01-01 RX ADMIN — CHLORHEXIDINE GLUCONATE 1 APPLICATION(S): 213 SOLUTION TOPICAL at 05:21

## 2019-01-01 RX ADMIN — SODIUM CHLORIDE 100 MILLILITER(S): 9 INJECTION, SOLUTION INTRAVENOUS at 05:42

## 2019-01-01 RX ADMIN — SODIUM CHLORIDE 1000 MILLILITER(S): 9 INJECTION, SOLUTION INTRAVENOUS at 07:08

## 2019-01-01 RX ADMIN — SCOPALAMINE 1 PATCH: 1 PATCH, EXTENDED RELEASE TRANSDERMAL at 18:03

## 2019-01-01 RX ADMIN — DEXTROSE MONOHYDRATE, SODIUM CHLORIDE, AND POTASSIUM CHLORIDE 100 MILLILITER(S): 50; .745; 4.5 INJECTION, SOLUTION INTRAVENOUS at 13:44

## 2019-01-01 RX ADMIN — HEPARIN SODIUM 5000 UNIT(S): 5000 INJECTION INTRAVENOUS; SUBCUTANEOUS at 22:37

## 2019-01-01 RX ADMIN — Medication 1000 MILLIGRAM(S): at 16:18

## 2019-01-01 RX ADMIN — SODIUM CHLORIDE 6000 MILLILITER(S): 9 INJECTION, SOLUTION INTRAVENOUS at 21:46

## 2019-01-01 RX ADMIN — CHLORHEXIDINE GLUCONATE 15 MILLILITER(S): 213 SOLUTION TOPICAL at 17:26

## 2019-01-01 RX ADMIN — HEPARIN SODIUM 5000 UNIT(S): 5000 INJECTION INTRAVENOUS; SUBCUTANEOUS at 21:27

## 2019-01-01 RX ADMIN — PIPERACILLIN AND TAZOBACTAM 25 GRAM(S): 4; .5 INJECTION, POWDER, LYOPHILIZED, FOR SOLUTION INTRAVENOUS at 05:22

## 2019-01-01 RX ADMIN — HEPARIN SODIUM 5000 UNIT(S): 5000 INJECTION INTRAVENOUS; SUBCUTANEOUS at 05:44

## 2019-01-01 RX ADMIN — MORPHINE SULFATE 2 MILLIGRAM(S): 50 CAPSULE, EXTENDED RELEASE ORAL at 18:55

## 2019-01-01 RX ADMIN — MORPHINE SULFATE 2 MILLIGRAM(S): 50 CAPSULE, EXTENDED RELEASE ORAL at 01:03

## 2019-01-01 RX ADMIN — Medication 70 MILLIGRAM(S): at 14:40

## 2019-01-01 RX ADMIN — HEPARIN SODIUM 5000 UNIT(S): 5000 INJECTION INTRAVENOUS; SUBCUTANEOUS at 13:46

## 2019-01-01 RX ADMIN — Medication 25 MILLILITER(S): at 01:11

## 2019-01-01 RX ADMIN — PIPERACILLIN AND TAZOBACTAM 25 GRAM(S): 4; .5 INJECTION, POWDER, LYOPHILIZED, FOR SOLUTION INTRAVENOUS at 18:31

## 2019-01-01 RX ADMIN — POTASSIUM PHOSPHATE, MONOBASIC POTASSIUM PHOSPHATE, DIBASIC 83.33 MILLIMOLE(S): 236; 224 INJECTION, SOLUTION INTRAVENOUS at 23:55

## 2019-01-01 RX ADMIN — PIPERACILLIN AND TAZOBACTAM 25 GRAM(S): 4; .5 INJECTION, POWDER, LYOPHILIZED, FOR SOLUTION INTRAVENOUS at 17:26

## 2019-01-01 RX ADMIN — HEPARIN SODIUM 5000 UNIT(S): 5000 INJECTION INTRAVENOUS; SUBCUTANEOUS at 06:07

## 2019-01-01 RX ADMIN — MORPHINE SULFATE 2 MILLIGRAM(S): 50 CAPSULE, EXTENDED RELEASE ORAL at 06:15

## 2019-01-01 RX ADMIN — SCOPALAMINE 1 PATCH: 1 PATCH, EXTENDED RELEASE TRANSDERMAL at 18:07

## 2019-01-01 RX ADMIN — CHLORHEXIDINE GLUCONATE 1 APPLICATION(S): 213 SOLUTION TOPICAL at 05:44

## 2019-01-01 RX ADMIN — DEXMEDETOMIDINE HYDROCHLORIDE IN 0.9% SODIUM CHLORIDE 2.3 MICROGRAM(S)/KG/HR: 4 INJECTION INTRAVENOUS at 15:07

## 2019-01-01 RX ADMIN — CHLORHEXIDINE GLUCONATE 1 APPLICATION(S): 213 SOLUTION TOPICAL at 06:33

## 2019-01-01 RX ADMIN — SCOPALAMINE 1 PATCH: 1 PATCH, EXTENDED RELEASE TRANSDERMAL at 07:02

## 2019-01-01 RX ADMIN — Medication 25 MICROGRAM(S): at 05:42

## 2019-01-01 RX ADMIN — HEPARIN SODIUM 5000 UNIT(S): 5000 INJECTION INTRAVENOUS; SUBCUTANEOUS at 14:15

## 2019-01-01 RX ADMIN — MORPHINE SULFATE 2 MILLIGRAM(S): 50 CAPSULE, EXTENDED RELEASE ORAL at 13:55

## 2019-01-01 RX ADMIN — PIPERACILLIN AND TAZOBACTAM 25 GRAM(S): 4; .5 INJECTION, POWDER, LYOPHILIZED, FOR SOLUTION INTRAVENOUS at 17:27

## 2019-01-01 RX ADMIN — Medication 100 MILLIEQUIVALENT(S): at 10:33

## 2019-01-01 RX ADMIN — PIPERACILLIN AND TAZOBACTAM 25 GRAM(S): 4; .5 INJECTION, POWDER, LYOPHILIZED, FOR SOLUTION INTRAVENOUS at 05:53

## 2019-01-01 RX ADMIN — SCOPALAMINE 1 PATCH: 1 PATCH, EXTENDED RELEASE TRANSDERMAL at 18:11

## 2019-01-01 RX ADMIN — SODIUM CHLORIDE 1450 MILLILITER(S): 9 INJECTION, SOLUTION INTRAVENOUS at 15:18

## 2019-01-01 RX ADMIN — Medication 12.5 MICROGRAM(S): at 23:11

## 2019-01-01 RX ADMIN — SCOPALAMINE 1 PATCH: 1 PATCH, EXTENDED RELEASE TRANSDERMAL at 19:25

## 2019-01-01 RX ADMIN — PIPERACILLIN AND TAZOBACTAM 25 GRAM(S): 4; .5 INJECTION, POWDER, LYOPHILIZED, FOR SOLUTION INTRAVENOUS at 06:33

## 2019-01-01 RX ADMIN — SODIUM CHLORIDE 100 MILLILITER(S): 9 INJECTION, SOLUTION INTRAVENOUS at 21:33

## 2019-01-01 RX ADMIN — MORPHINE SULFATE 2 MILLIGRAM(S): 50 CAPSULE, EXTENDED RELEASE ORAL at 13:04

## 2019-01-01 RX ADMIN — MORPHINE SULFATE 2 MILLIGRAM(S): 50 CAPSULE, EXTENDED RELEASE ORAL at 13:14

## 2019-01-01 RX ADMIN — ROBINUL 0.4 MILLIGRAM(S): 0.2 INJECTION INTRAMUSCULAR; INTRAVENOUS at 15:33

## 2019-01-01 RX ADMIN — SODIUM CHLORIDE 3000 MILLILITER(S): 9 INJECTION, SOLUTION INTRAVENOUS at 10:33

## 2019-01-01 RX ADMIN — SODIUM CHLORIDE 100 MILLILITER(S): 9 INJECTION, SOLUTION INTRAVENOUS at 05:21

## 2019-01-01 RX ADMIN — Medication 25 MICROGRAM(S): at 05:44

## 2019-01-01 RX ADMIN — MORPHINE SULFATE 2 MILLIGRAM(S): 50 CAPSULE, EXTENDED RELEASE ORAL at 15:32

## 2019-01-01 RX ADMIN — MORPHINE SULFATE 2 MILLIGRAM(S): 50 CAPSULE, EXTENDED RELEASE ORAL at 01:30

## 2019-01-01 RX ADMIN — PIPERACILLIN AND TAZOBACTAM 200 GRAM(S): 4; .5 INJECTION, POWDER, LYOPHILIZED, FOR SOLUTION INTRAVENOUS at 15:08

## 2019-01-01 RX ADMIN — MORPHINE SULFATE 2 MILLIGRAM(S): 50 CAPSULE, EXTENDED RELEASE ORAL at 18:17

## 2019-01-01 RX ADMIN — ROBINUL 0.4 MILLIGRAM(S): 0.2 INJECTION INTRAMUSCULAR; INTRAVENOUS at 22:34

## 2019-01-01 RX ADMIN — ROBINUL 0.4 MILLIGRAM(S): 0.2 INJECTION INTRAMUSCULAR; INTRAVENOUS at 13:14

## 2019-01-01 RX ADMIN — MORPHINE SULFATE 2 MILLIGRAM(S): 50 CAPSULE, EXTENDED RELEASE ORAL at 12:09

## 2019-01-01 RX ADMIN — PIPERACILLIN AND TAZOBACTAM 3.38 GRAM(S): 4; .5 INJECTION, POWDER, LYOPHILIZED, FOR SOLUTION INTRAVENOUS at 15:38

## 2019-01-01 RX ADMIN — ROBINUL 0.4 MILLIGRAM(S): 0.2 INJECTION INTRAMUSCULAR; INTRAVENOUS at 21:00

## 2019-01-01 RX ADMIN — SODIUM CHLORIDE 1450 MILLILITER(S): 9 INJECTION, SOLUTION INTRAVENOUS at 16:18

## 2019-01-01 RX ADMIN — MORPHINE SULFATE 2 MILLIGRAM(S): 50 CAPSULE, EXTENDED RELEASE ORAL at 22:33

## 2019-01-01 RX ADMIN — MORPHINE SULFATE 2 MILLIGRAM(S): 50 CAPSULE, EXTENDED RELEASE ORAL at 16:10

## 2019-01-01 RX ADMIN — MORPHINE SULFATE 2 MILLIGRAM(S): 50 CAPSULE, EXTENDED RELEASE ORAL at 18:07

## 2019-01-01 RX ADMIN — Medication 100 MILLIEQUIVALENT(S): at 09:22

## 2019-01-01 RX ADMIN — PIPERACILLIN AND TAZOBACTAM 25 GRAM(S): 4; .5 INJECTION, POWDER, LYOPHILIZED, FOR SOLUTION INTRAVENOUS at 05:30

## 2019-01-01 RX ADMIN — Medication 100 MILLIEQUIVALENT(S): at 13:45

## 2019-01-01 RX ADMIN — ROBINUL 0.4 MILLIGRAM(S): 0.2 INJECTION INTRAMUSCULAR; INTRAVENOUS at 21:59

## 2019-06-07 NOTE — ED PROCEDURE NOTE - NS_EDPROVIDERDISPOUSERTYPE_ED_A_ED
DKA, new onset Type 1 diabetes
Attending Attestation (For Attendings USE Only)...
Attending Attestation (For Attendings USE Only)...

## 2019-06-07 NOTE — H&P ADULT - PROBLEM SELECTOR PLAN 1
Continue to pursue evidence-based ventilatory strategy with limited tidal volumes, attention to plateau pressures, head of bed elevation >30 degrees, chlorhexidine oral care, and stress ulcer prophylaxis.  daily SAT/ABT  change precedex to propofol for now  per discussion with son will want time limited trial of intubation but no long term vent or trach

## 2019-06-07 NOTE — ED PROVIDER NOTE - PHYSICAL EXAMINATION
Pat Rodríguez M.D.:   patient unresponsive, not withdrawing to pain, tachypnic, in oderate resp distress, hypoxic to 60% on RA.   LUNGS Coarse b/l with diffuse crackles, tachypnic, hypoxic.   CARD RRR no m/r/g.    Abdomen soft NT ND .   EXT WWP CV 2+DP/PT bilaterally.   neuro A&Ox0, not withdrawaing to pain, not blinking to confrontation, .    skin warm and dry no rash  HEENT: dry mucous membranes, PERRL, EOMI

## 2019-06-07 NOTE — ED ADULT NURSE REASSESSMENT NOTE - REASSESS COMMUNICATION
MD Broussard made aware no urine output despite fluids given thus far, plan to bladder scan. made aware of BP 82/47, 1L plasmalyte bolus ordered. precedex gtt stopped at this time as d/c order, as per MD Broussard not to start propofol gtt at this time.

## 2019-06-07 NOTE — ED ADULT NURSE NOTE - OBJECTIVE STATEMENT
Patient was getting afternoon care by her son this afternoon when pt. went unresponsive, presents to ED with oxygen sat 60%- agonal breathing.

## 2019-06-07 NOTE — H&P ADULT - HISTORY OF PRESENT ILLNESS
89 year old female with PMH CVA 2018, cerebral aneurysm 2009, who is cared for by son at home. Per son at bedside, she was in her usual state of health until this morning when he could not arouse her, he tried to do things to stimulate her, he changed her and noticed she was not breathing normal and put her in the car and brought her to the ER. Son states that he is her primary care giver at home and she is 100% dependent on him for care. He uses "Thick IT" to thicken liquids he feeds to her but had a suspicion that she was aspirating still. He reports her having a cough and sometimes bringing up mucus, also that she was not tolerating all 3 meals a day, some days she would throw up her breakfast other days would throw up her dinner and vice versa. In ER pt was hypoxic and unresponsive and was intubated. CXR significant for bilateral infiltrates. Chemistry reveals hypernatremia, JUAN FRANCISCO

## 2019-06-07 NOTE — H&P ADULT - PROBLEM SELECTOR PLAN 7
son Stephen Nesbitt at bedside, he resides with patient and is her POA and stated he has medical decision making power as well as he is her primary caregiver in the home, he is understanding that currently she may have aspirated and has acute pneumonia but would not want his mother to be kept on vent for prolonged period of time or want her to have trach. He stated she has lived a long wonderful life and if she goes, that is it. He agrees for DNR and time limited vent trial, maximal medical therapy for now. EROS placed on chart.

## 2019-06-07 NOTE — ED PROVIDER NOTE - PROGRESS NOTE DETAILS
Pat Rodríguez M.D: quick discussion had with son and aunt at bedside, who request that everything be done including intubation. decision made to proceed with intubation given poor mental status and profound hypoxia. preoxygenated with NRB, NC< and BVM; RSI with etomidate and natali. pt noted to be blinking and withdrawing once oxygenation improved. view with DL noted significant thick purulent secretions, suctioned. intubated without issue. Pt. accepted to ICU. Spoke to ICU PA(Viri).

## 2019-06-07 NOTE — H&P ADULT - ASSESSMENT
89 y/o female with h/o CVA, dysphagia admitted with hypoxic respiratory failure due to bilateral pneumonia most likely due to aspiration.

## 2019-06-07 NOTE — PHARMACOTHERAPY INTERVENTION NOTE - COMMENTS
Completed patient's medication reconciliation. All medication information obtained from family and verified with home pharmacy.
RSI

## 2019-06-07 NOTE — ED ADULT NURSE REASSESSMENT NOTE - NS ED NURSE REASSESS COMMENT FT1
Patient intubated with 20mg of etimodate @1439/70mg-natali @1440, 7 tube/22@lip-1441.  16FR OG inserted by SHANICE Swenson.

## 2019-06-07 NOTE — ED PROVIDER NOTE - OBJECTIVE STATEMENT
Pat Rodríguez M.D: 89F hx htn, cva in past, nonverbal at baseline, brought in by family unresponsive. noted to have eyes open but not moving. family notes she was normal yesterday but did seem to be having trouble breathing. unable to obtain further history given pt clinical status.

## 2019-06-07 NOTE — H&P ADULT - PROBLEM SELECTOR PLAN 6
overall poor state at home, able to take PO with dysphagia and having recent intolerance to PO  will start tube feeds via OGT

## 2019-06-07 NOTE — ED ADULT TRIAGE NOTE - CHIEF COMPLAINT QUOTE
pt brought to er by son he found unresponsive , pt is breathing and has a pulse eyes open not responding to verbal stimuli

## 2019-06-07 NOTE — H&P ADULT - GASTROINTESTINAL DETAILS
Abdomen soft, non-tender, no guarding.
bowel sounds normal/no distention/nontender/soft/OGT with brown fluid in suction bucket

## 2019-06-07 NOTE — ED ADULT NURSE NOTE - NSIMPLEMENTINTERV_GEN_ALL_ED
Implemented All Fall with Harm Risk Interventions:  [place] to call system. Call bell, personal items and telephone within reach. Instruct patient to call for assistance. Room bathroom lighting operational. Non-slip footwear when patient is off stretcher. Physically safe environment: no spills, clutter or unnecessary equipment. Stretcher in lowest position, wheels locked, appropriate side rails in place. Provide visual cue, wrist band, yellow gown, etc. Monitor gait and stability. Monitor for mental status changes and reorient to person, place, and time. Review medications for side effects contributing to fall risk. Reinforce activity limits and safety measures with patient and family. Provide visual clues: red socks.

## 2019-06-07 NOTE — ED PROCEDURE NOTE - CPROC ED GASTRIC INTUB DETAIL1
Placement was confirmed by aspiration of gastric secretions./The orogastric tube (see size above) was inserted via the anatomic location./Gastric tube connected to low continuous suction.

## 2019-06-08 NOTE — PROGRESS NOTE ADULT - PROBLEM SELECTOR PLAN 2
on POCUS seems to remain intravascularly volume depleted; give a volume bolus now; d/c "maintainance fluids" -- start tube feeds and free water.  re-eval after volume administration.

## 2019-06-08 NOTE — PROGRESS NOTE ADULT - SUBJECTIVE AND OBJECTIVE BOX
Patient is a 89y old  Female who presents with a chief complaint of respiratory failure (2019 18:55)      BRIEF HOSPITAL COURSE: Admitted  with acute on chronic aspiration with respiratory failure.  Progressive dysphagia last couple of weeks    Events last 24 hours: has had some hypotensive episodes; given more fluid overnight.  Total 3.5L administered.    PAST MEDICAL & SURGICAL HISTORY:  HTN (hypertension)  CVA (cerebral vascular accident)  No significant past surgical history        Medications:  piperacillin/tazobactam IVPB. 3.375 Gram(s) IV Intermittent every 12 hours            aspirin  chewable 81 milliGRAM(s) Oral daily  heparin  Injectable 5000 Unit(s) SubCutaneous every 8 hours    famotidine Injectable 20 milliGRAM(s) IV Push daily      levothyroxine 25 MICROGram(s) Oral daily    multiple electrolytes Injection Type 1 Bolus 500 milliLiter(s) IV Bolus once  potassium chloride  10 mEq/100 mL IVPB 10 milliEquivalent(s) IV Intermittent every 1 hour      chlorhexidine 0.12% Liquid 15 milliLiter(s) Oral Mucosa two times a day  chlorhexidine 2% Cloths 1 Application(s) Topical <User Schedule>        Mode: AC/ CMV (Assist Control/ Continuous Mandatory Ventilation)  RR (machine): 16  TV (machine): 400  FiO2: 30  PEEP: 5  PS: 50  MAP: 11  PIP: 32      ICU Vital Signs Last 24 Hrs  T(C): 36.6 (2019 07:26), Max: 37.4 (2019 14:56)  T(F): 97.9 (2019 07:26), Max: 99.4 (2019 14:56)  HR: 57 (2019 09:00) (51 - 88)  BP: 91/51 (2019 09:00) (79/49 - 149/70)  BP(mean): 69 (2019 09:00) (57 - 76)  ABP: --  ABP(mean): --  RR: 16 (2019 09:00) (5 - 32)  SpO2: 96% (2019 09:00) (60% - 100%)      ABG - ( 2019 15:02 )  pH, Arterial: 7.40  pH, Blood: x     /  pCO2: 37    /  pO2: 474   / HCO3: 23    / Base Excess: -1.5  /  SaO2: 99                  I&O's Detail    2019 07:01  -  2019 07:00  --------------------------------------------------------  IN:    multiple electrolytes Injection Type 1multiple electrolytes Injection Type 1: 800 mL    Solution: 75 mL  Total IN: 875 mL    OUT:    Incontinent per Collection Ba mL  Total OUT: 150 mL    Total NET: 725 mL      2019 07:01  -  2019 09:56  --------------------------------------------------------  IN:    multiple electrolytes Injection Type 1multiple electrolytes Injection Type 1: 200 mL    Solution: 25 mL    Solution: 100 mL  Total IN: 325 mL    OUT:  Total OUT: 0 mL    Total NET: 325 mL            LABS:                        9.7    7.6   )-----------( 180      ( 2019 06:46 )             30.2     06-08    151<H>  |  109<H>  |  34.0<H>  ----------------------------<  133<H>  3.2<L>   |  25.0  |  1.40<H>    Ca    7.4<L>      2019 06:46  Mg     1.9     06-07    TPro  4.5<L>  /  Alb  2.2<L>  /  TBili  0.7  /  DBili  x   /  AST  20  /  ALT  11  /  AlkPhos  52  06-08          CAPILLARY BLOOD GLUCOSE      POCT Blood Glucose.: 119 mg/dL (2019 14:29)    PT/INR - ( 2019 15:03 )   PT: 12.9 sec;   INR: 1.12 ratio         PTT - ( 2019 15:03 )  PTT:23.5 sec    CULTURES:      Physical Examination:    General: No acute distress.      HEENT: Pupils equal, reactive to light.  Symmetric.    PULM: Coarse bilaterally without wheezing    NECK: Supple, no lymphadenopathy, trachea midline    CVS: Regular rate and rhythm, no murmurs, rubs, or gallops    ABD: Soft, nondistended, nontender, normoactive bowel sounds, no masses    EXT: No edema, nontender    SKIN: Warm and well perfused, no rashes noted.    NEURO: unresponsive, not responsive to noxious stimuli    DEVICES:  none.    RADIOLOGY: L infiltrates  POINT OF CARE ULTRASOUND: collapsing underfilled LV with B lines upper L; no b lines R sided.    CRITICAL CARE TIME SPENT: 45m

## 2019-06-08 NOTE — PROVIDER CONTACT NOTE (CRITICAL VALUE NOTIFICATION) - BACKGROUND
Patient has been bedbound recently with difficulty swallowing thickened liquids. Baseline patient requires 100% support for all ADL's and is nonverbal.

## 2019-06-08 NOTE — PROGRESS NOTE ADULT - SUBJECTIVE AND OBJECTIVE BOX
Patient is a 89y old  Female who presents with a chief complaint of respiratory failure (08 Jun 2019 09:55)    PAST MEDICAL & SURGICAL HISTORY:  HTN (hypertension)  CVA (cerebral vascular accident)  No significant past surgical history    NORIETTE SALOME   89y    Female    BRIEF HOSPITAL COURSE:    Review of Systems:                       All other ROS are negative.    Allergies    No Known Allergies    Intolerances      Weight (kg): 49.1 (06-07-19 @ 23:45)    ICU Vital Signs Last 24 Hrs  T(C): 37 (08 Jun 2019 19:00), Max: 37 (08 Jun 2019 19:00)  T(F): 98.6 (08 Jun 2019 19:00), Max: 98.6 (08 Jun 2019 19:00)  HR: 63 (08 Jun 2019 21:00) (51 - 88)  BP: 98/54 (08 Jun 2019 21:00) (79/49 - 110/51)  BP(mean): 72 (08 Jun 2019 21:00) (57 - 76)  ABP: --  ABP(mean): --  RR: 14 (08 Jun 2019 21:00) (5 - 22)  SpO2: 99% (08 Jun 2019 21:00) (91% - 100%)    Physical Examination:    General:     HEENT:     PULM:     CVS:     ABD:     EXT:     SKIN:     Neuro:    ABG - ( 07 Jun 2019 15:02 )  pH, Arterial: 7.40  pH, Blood: x     /  pCO2: 37    /  pO2: 474   / HCO3: 23    / Base Excess: -1.5  /  SaO2: 99                Mode: AC/ CMV (Assist Control/ Continuous Mandatory Ventilation)  RR (machine): 16  TV (machine): 400  FiO2: 30  PEEP: 5  MAP: 11  PIP: 90    Mode: AC/ CMV (Assist Control/ Continuous Mandatory Ventilation), RR (machine): 16, TV (machine): 400, FiO2: 30, PEEP: 5, MAP: 11, PIP: 90  LABS:                        9.7    7.6   )-----------( 180      ( 08 Jun 2019 06:46 )             30.2     06-08    144  |  106  |  30.0<H>  ----------------------------<  160<H>  2.8<LL>   |  22.0  |  1.10    Ca    7.1<L>      08 Jun 2019 17:48  Phos  1.1     06-08  Mg     2.0     06-08    TPro  4.5<L>  /  Alb  2.2<L>  /  TBili  0.7  /  DBili  x   /  AST  20  /  ALT  11  /  AlkPhos  52  06-08          CAPILLARY BLOOD GLUCOSE        PT/INR - ( 07 Jun 2019 15:03 )   PT: 12.9 sec;   INR: 1.12 ratio         PTT - ( 07 Jun 2019 15:03 )  PTT:23.5 sec    CULTURES:      Medications:  MEDICATIONS  (STANDING):  aspirin  chewable 81 milliGRAM(s) Oral daily  chlorhexidine 0.12% Liquid 15 milliLiter(s) Oral Mucosa two times a day  chlorhexidine 2% Cloths 1 Application(s) Topical <User Schedule>  famotidine Injectable 20 milliGRAM(s) IV Push daily  heparin  Injectable 5000 Unit(s) SubCutaneous every 8 hours  levothyroxine 25 MICROGram(s) Oral daily  piperacillin/tazobactam IVPB. 3.375 Gram(s) IV Intermittent every 12 hours    MEDICATIONS  (PRN):        06-07 @ 07:01  -  06-08 @ 07:00  --------------------------------------------------------  IN: 875 mL / OUT: 150 mL / NET: 725 mL    06-08 @ 07:01  -  06-08 @ 21:36  --------------------------------------------------------  IN: 2666.6 mL / OUT: 250 mL / NET: 2416.6 mL        RADIOLOGY/IMAGING/ECHO    < from: Xray Chest 1 View-PORTABLE IMMEDIATE (06.07.19 @ 15:22) >  IMPRESSION:   Perihilar and LEFT upper lobe opacities concerning for   infectious pneumonia..  ET tube tip above tracheal bifurcation.  NG tube tip beyond GE junction.      Assessment/Plan:          Called arvind Hurtado    CRITICAL CARE TIME SPENT: 37 minutes assessing presenting problems of acute illness, which pose high probability of life threatening deterioration or end organ damage/dysfunction, as well as medical decision making including initiating plan of care, reviewing data, reviewing radiologic exams, discussing with multidisciplinary team,  discussing goals of care with patient/family, and writing this note.  Non-inclusive of procedures performed, Patient is a 89y old  Female who presents with a chief complaint of respiratory failure (08 Jun 2019 09:55)    PAST MEDICAL & SURGICAL HISTORY:  HTN (hypertension)  CVA (cerebral vascular accident)  No significant past surgical history    NORIETTE SALOME   89y    Female        Review of Systems: UATO vented demented                       Allergies    No Known Allergies    Intolerances      Weight (kg): 49.1 (06-07-19 @ 23:45)    ICU Vital Signs Last 24 Hrs  T(C): 37 (08 Jun 2019 19:00), Max: 37 (08 Jun 2019 19:00)  T(F): 98.6 (08 Jun 2019 19:00), Max: 98.6 (08 Jun 2019 19:00)  HR: 63 (08 Jun 2019 21:00) (51 - 88)  BP: 98/54 (08 Jun 2019 21:00) (79/49 - 110/51)  BP(mean): 72 (08 Jun 2019 21:00) (57 - 76)  ABP: --  ABP(mean): --  RR: 14 (08 Jun 2019 21:00) (5 - 22)  SpO2: 99% (08 Jun 2019 21:00) (91% - 100%)    Physical Examination:      Physical Examination:    General: No acute distress.      HEENT: Pupils equal, reactive to light.  Symmetric.    PULM: Coarse bilaterally without wheezing    NECK: Supple, no lymphadenopathy, trachea midline    CVS: Regular rate and rhythm, no murmurs, rubs, or gallops    ABD: Soft, nondistended, nontender, normoactive bowel sounds, no masses    EXT: No edema, nontender    SKIN: Warm and well perfused, no rashes noted.    NEURO: opens eyes no commands       ABG - ( 07 Jun 2019 15:02 )  pH, Arterial: 7.40  pH, Blood: x     /  pCO2: 37    /  pO2: 474   / HCO3: 23    / Base Excess: -1.5  /  SaO2: 99              Mode: AC/ CMV (Assist Control/ Continuous Mandatory Ventilation)  RR (machine): 16  TV (machine): 400  FiO2: 30  PEEP: 5  MAP: 11  PIP: 90    Mode: AC/ CMV (Assist Control/ Continuous Mandatory Ventilation), RR (machine): 16, TV (machine): 400, FiO2: 30, PEEP: 5, MAP: 11, PIP: 90  LABS:                        9.7    7.6   )-----------( 180      ( 08 Jun 2019 06:46 )             30.2     06-08    144  |  106  |  30.0<H>  ----------------------------<  160<H>  2.8<LL>   |  22.0  |  1.10    Ca    7.1<L>      08 Jun 2019 17:48  Phos  1.1     06-08  Mg     2.0     06-08    TPro  4.5<L>  /  Alb  2.2<L>  /  TBili  0.7  /  DBili  x   /  AST  20  /  ALT  11  /  AlkPhos  52  06-08          CAPILLARY BLOOD GLUCOSE        PT/INR - ( 07 Jun 2019 15:03 )   PT: 12.9 sec;   INR: 1.12 ratio         PTT - ( 07 Jun 2019 15:03 )  PTT:23.5 sec    CULTURES:      Medications:  MEDICATIONS  (STANDING):  aspirin  chewable 81 milliGRAM(s) Oral daily  chlorhexidine 0.12% Liquid 15 milliLiter(s) Oral Mucosa two times a day  chlorhexidine 2% Cloths 1 Application(s) Topical <User Schedule>  famotidine Injectable 20 milliGRAM(s) IV Push daily  heparin  Injectable 5000 Unit(s) SubCutaneous every 8 hours  levothyroxine 25 MICROGram(s) Oral daily  piperacillin/tazobactam IVPB. 3.375 Gram(s) IV Intermittent every 12 hours    MEDICATIONS  (PRN):        06-07 @ 07:01 - 06-08 @ 07:00  --------------------------------------------------------  IN: 875 mL / OUT: 150 mL / NET: 725 mL    06-08 @ 07:01 - 06-08 @ 21:36  --------------------------------------------------------  IN: 2666.6 mL / OUT: 250 mL / NET: 2416.6 mL        RADIOLOGY/IMAGING/ECHO    < from: Xray Chest 1 View-PORTABLE IMMEDIATE (06.07.19 @ 15:22) >  IMPRESSION:   Perihilar and LEFT upper lobe opacities concerning for   infectious pneumonia..  ET tube tip above tracheal bifurcation.  NG tube tip beyond GE junction.      Assessment/Plan:      89 y/o female with h/o CVA, dysphagia hypothyroidism admitted with hypoxic respiratory failure due to bilateral pneumonia most likely due to aspiration.         Aspiration PNA advanced dementia  zosyn  sputum culture no wbc/org     JUAN FRANCISCO hypernatremia resolved.  cut the free h20      Low K+ PO4- replacing     MS better, but still poor  now on SBT doing ok      Granddaughter is a hospice nurse, she  visited and asked the bedside RN, "why is she on the vent"   Called son Reggie Nesbitt to discuss, went to  with no ability to leave message.      CRITICAL CARE TIME SPENT: 32 minutes assessing presenting problems of acute illness, which pose high probability of life threatening deterioration or end organ damage/dysfunction, as well as medical decision making including initiating plan of care, reviewing data, reviewing radiologic exams, discussing with multidisciplinary team,  discussing goals of care with patient/family, and writing this note.  Non-inclusive of procedures performed,

## 2019-06-08 NOTE — PROVIDER CONTACT NOTE (CRITICAL VALUE NOTIFICATION) - ACTION/TREATMENT ORDERED:
PA made aware. RN instructed to call eICU, RN called from inside room and spoke with eICU physician. Physician will order potassium repletion as sees fit. RN will continue to monitor.

## 2019-06-08 NOTE — PROGRESS NOTE ADULT - PROBLEM SELECTOR PLAN 7
at risk for refeeding syndrome if poor PO intake as history suggests.  Will recheck labs 6h after starting tube feeds and again 12h after that.

## 2019-06-09 NOTE — DIETITIAN INITIAL EVALUATION ADULT. - ETIOLOGY
related to inability to meet sufficient protein-energy in setting of advanced age, dysphagia, and skin breakdown

## 2019-06-09 NOTE — CHART NOTE - NSCHARTNOTEFT_GEN_A_CORE
Upon Nutritional Assessment by the Registered Dietitian your patient was determined to meet criteria / has evidence of the following diagnosis/diagnoses:          [ ]  Mild Protein Calorie Malnutrition        [ ]  Moderate Protein Calorie Malnutrition        [x] Severe Protein Calorie Malnutrition        [ ] Unspecified Protein Calorie Malnutrition        [ ] Underweight / BMI <19        [ ] Morbid Obesity / BMI > 40    Pt presents with malnutrition (severe, chronic) related to inability to meet sufficient protein-energy in setting of advanced age, dysphagia, and skin breakdown as evidenced by meeting <75% nutrient needs >1 month, severe muscle loss of temples and clavicles and severe fat loss of orbitals and buccal pads.      Findings as based on:  •  Comprehensive nutrition assessment and consultation  •  Calorie counts (nutrient intake analysis)  •  Food acceptance and intake status from observations by staff  •  Follow up  •  Patient education  •  Intervention secondary to interdisciplinary rounds  •   concerns      Treatment:    The following diet has been recommended:    1) Nutrition/hydration per pt/pt family wishes- RD to remain available.       PROVIDER Section:     By signing this assessment you are acknowledging and agree with the diagnosis/diagnoses assigned by the Registered Dietitian    Comments:

## 2019-06-09 NOTE — PROGRESS NOTE ADULT - SUBJECTIVE AND OBJECTIVE BOX
Patient is a 89y old  Female who presents with a chief complaint of respiratory failure (07 Jun 2019 18:55)      BRIEF HOSPITAL COURSE: Admitted 6/7 with acute on chronic aspiration with respiratory failure.  Progressive dysphagia last couple of weeks    Events last 24 hours: performing well on breathing trial; psv 5/5 30% with RR 20 and Vt 300.    PAST MEDICAL & SURGICAL HISTORY:  HTN (hypertension)  CVA (cerebral vascular accident)  No significant past surgical history        Medications:  MEDICATIONS  (STANDING):  chlorhexidine 2% Cloths 1 Application(s) Topical <User Schedule>  levothyroxine 25 MICROGram(s) Oral daily  piperacillin/tazobactam IVPB. 3.375 Gram(s) IV Intermittent every 12 hours    MEDICATIONS  (PRN):  LORazepam   Injectable 0.5 milliGRAM(s) IV Push every 2 hours PRN Anxiety  morphine  - Injectable 2 milliGRAM(s) IV Push every 2 hours PRN dyspnea      Mode: CPAP with PS  FiO2: 30  PEEP: 5  PS: 5  MAP: 7    ICU Vital Signs Last 24 Hrs  T(C): 36.8 (09 Jun 2019 07:30), Max: 37 (08 Jun 2019 19:00)  T(F): 98.2 (09 Jun 2019 07:30), Max: 98.6 (08 Jun 2019 19:00)  HR: 69 (09 Jun 2019 08:00) (56 - 69)  BP: 96/54 (09 Jun 2019 07:00) (83/53 - 100/55)  BP(mean): 69 (09 Jun 2019 07:00) (61 - 75)  ABP: --  ABP(mean): --  RR: 22 (09 Jun 2019 07:00) (7 - 23)  SpO2: 100% (09 Jun 2019 08:00) (91% - 100%)          I&O's Summary    08 Jun 2019 07:01  -  09 Jun 2019 07:00  --------------------------------------------------------  IN: 4474.8 mL / OUT: 400 mL / NET: 4074.8 mL            LABS:                        9.7    7.6   )-----------( 180      ( 08 Jun 2019 06:46 )             30.2     06-09    144  |  104  |  29.0<H>  ----------------------------<  177<H>  3.4<L>   |  24.0  |  1.21    Ca    6.9<L>      09 Jun 2019 02:29  Phos  4.5     06-09  Mg     2.0     06-09    TPro  4.5<L>  /  Alb  2.2<L>  /  TBili  0.7  /  DBili  x   /  AST  20  /  ALT  11  /  AlkPhos  52  06-08                        9.5    10.7  )-----------( 174      ( 09 Jun 2019 02:29 )             29.3       CULTURES:      Physical Examination:    General: No acute distress.      HEENT: Pupils equal, reactive to light.  Symmetric.    PULM: Coarse bilaterally without wheezing    NECK: Supple, no lymphadenopathy, trachea midline    CVS: Regular rate and rhythm, no murmurs, rubs, or gallops    ABD: Soft, nondistended, nontender, normoactive bowel sounds, no masses    EXT: No edema, nontender    SKIN: Warm and well perfused, no rashes noted.    NEURO: eyes open, moving spontaneously not to command.    DEVICES:  none.    RADIOLOGY: reviewed    CRITICAL CARE TIME SPENT: 35 min

## 2019-06-09 NOTE — DIETITIAN INITIAL EVALUATION ADULT. - PERTINENT LABORATORY DATA
06-09 Na144 mmol/L Glu 177 mg/dL<H> K+ 3.4 mmol/L<L> Cr  1.21 mg/dL BUN 29.0 mg/dL<H> Phos 4.5 mg/dL Alb n/a   PAB n/a

## 2019-06-09 NOTE — PATIENT PROFILE ADULT - NSPROPOAPRESSUREINJURYCT_GEN_A_NUR
"              After Visit Summary   5/8/2017    Marianne Monroy    MRN: [id number]           Patient Information     Date Of Birth          1954        Visit Information        Provider Department      5/8/2017 4:40 PM Abigail Arora MD [facility]        Today's Diagnoses     Uncontrolled diabetes mellitus type 2 without complications (H)    -  1    Generalized anxiety disorder        Gastroesophageal reflux disease, esophagitis presence not specified        Hyperlipidemia LDL goal <100        Hypertension, goal below 140/90          Care Instructions    *    Great that you went to [place]!    *    For the anxiety, try buspar. Start slowly.     *    No change in medications.     *    Follow up in 3 months.    *    Call with any questions.         Follow-ups after your visit        Who to contact     Normal or non-critical lab and imaging results will be communicated to you by i3 membranehart, letter or phone within 4 business days after the clinic has received the results. If you do not hear from us within 7 days, please contact the clinic through i3 membranehart or phone. If you have a critical or abnormal lab result, we will notify you by phone as soon as possible.  Submit refill requests through IPPLEX or call your pharmacy and they will forward the refill request to us. Please allow 3 business days for your refill to be completed.          If you need to speak with a  for additional information , please call: [phone]           Additional Information About Your Visit        IPPLEX Information     IPPLEX lets you send messages to your doctor, view your test results, renew your prescriptions, schedule appointments and more. To sign up, go to [email or web address] . Click on \"Log in\" on the left side of the screen, which will take you to the Welcome page. Then click on \"Sign up Now\" on the right side of the page.     You will be asked to enter the access code listed below, as well " "as some personal information. Please follow the directions to create your username and password.     Your access code is: [email or web address]  Expires: 2017  4:19 PM     Your access code will  in 90 days. If you need help or a new code, please call your [facility] clinic or [phone].        Care EveryWhere ID     This is your Care EveryWhere ID. This could be used by other organizations to access your [facility] medical records  [id number]        Your Vitals Were     Pulse Height BMI (Body Mass Index)             80 5' 4\" (1.626 m) 27.19 kg/m2          Blood Pressure from Last 3 Encounters:   17 126/80   16 120/76   16 112/80    Weight from Last 3 Encounters:   17 158 lb 6 oz (71.8 kg)   16 152 lb 2 oz (69 kg)   16 156 lb (70.8 kg)              We Performed the Following     Basic metabolic panel  (Ca, Cl, CO2, Creat, Gluc, K, Na, BUN)     FOOT EXAM     Hemoglobin A1c          Today's Medication Changes          These changes are accurate as of: 17  4:55 PM.  If you have any questions, ask your nurse or doctor.               Start taking these medicines.        Dose/Directions    busPIRone 5 MG tablet   Commonly known as:  BUSPAR   Used for:  Generalized anxiety disorder   Started by:  Abigail Arora MD        Start at 5 mg twice daily for 3 days, then 7.5 mg (1.5 tabs) twice daily for 3 days, then 10 mg (2 tabs) twice daily for 3 days, then 12.5 mg (2.5 tabs) twice daily for 3 days, then 15 mg (3 tabs) twice daily and stay at that dose   Quantity:  150 tablet   Refills:  0         These medicines have changed or have updated prescriptions.        Dose/Directions    fish oil-omega-3 fatty acids 1000 MG capsule   This may have changed:  Another medication with the same name was removed. Continue taking this medication, and follow the directions you see here.   Changed by:  Abigail Arora MD        1 tablet daily   Refills:  0            Where to get your medicines "      Some of these will need a paper prescription and others can be bought over the counter.  Ask your nurse if you have questions.     Bring a paper prescription for each of these medications     busPIRone 5 MG tablet                Primary Care Provider Office Phone # Fax #    Abigail Arora [phone]       [place] [facility] [address]   [place] [address]        Thank you!     Thank you for choosing [facility]  for your care. Our goal is always to provide you with excellent care. Hearing back from our patients is one way we can continue to improve our services. Please take a few minutes to complete the written survey that you may receive in the mail after your visit with us. Thank you!             Your Updated Medication List - Protect others around you: Learn how to safely use, store and throw away your medicines at www.disposemymeds.org.          This list is accurate as of: 5/8/17  4:55 PM.  Always use your most recent med list.                   Brand Name Dispense Instructions for use    aspirin 81 MG tablet      ONE DAILY       atorvastatin 40 MG tablet    LIPITOR    90 tablet    Take 1 tablet (40 mg) by mouth daily       blood glucose monitoring lancets     100 Box    1 each 2 times daily Use to test blood sugar 2 times daily or as directed.       blood glucose monitoring meter device kit    no brand specified    1 Kit    PER PATIENT HEALTH PLAN       blood glucose monitoring test strip    JESSE CONTOUR    100 each    1 strip by In Vitro route 2 times daily       busPIRone 5 MG tablet    BUSPAR    150 tablet    Start at 5 mg twice daily for 3 days, then 7.5 mg (1.5 tabs) twice daily for 3 days, then 10 mg (2 tabs) twice daily for 3 days, then 12.5 mg (2.5 tabs) twice daily for 3 days, then 15 mg (3 tabs) twice daily and stay at that dose       fish oil-omega-3 fatty acids 1000 MG capsule      1 tablet daily       glipiZIDE 10 MG 24 hr tablet    glipiZIDE XL     90 tablet    Take 1 tablet (10 mg) by mouth daily       HERBALS      sleep med- prn       lisinopril 20 MG tablet    PRINIVIL/ZESTRIL    90 tablet    Take 1 tablet (20 mg) by mouth daily       LORazepam 1 MG tablet    ATIVAN    30 tablet    Take 1 tablet (1 mg) by mouth every 8 hours as needed for anxiety       metFORMIN 500 MG 24 hr tablet    GLUCOPHAGE-XR    360 tablet    TAKE 4 TABLETS DAILY       Multiple vitamin Tabs      1 TABLET DAILY       omeprazole 20 MG CR capsule    priLOSEC    90 capsule    Take 1 capsule (20 mg) by mouth daily          3

## 2019-06-09 NOTE — CONSULT NOTE ADULT - ASSESSMENT
progressive dysphagia, acute on chronic aspiration, respiratory failure with multilobar bilateral aspiration pneumonia.

## 2019-06-09 NOTE — CONSULT NOTE ADULT - SUBJECTIVE AND OBJECTIVE BOX
FamHx: unable to obtain as patient is non-verbal  SocHx: lives with son   Medications: Reviewed  NKA    HPI:  Information obtained from medical record and Intensivist team.  89 year old female with PMH CVA 2018, cerebral aneurysm 2009, who is cared for by son at home. Per son at bedside, she was in her usual state of health until morning of admission when he could not arouse her.  He noticed she was not breathing normally and put her in the car and brought her to the ER. Son states that he is her primary care giver at home and she is 100% dependent on him for care. He uses "Thick IT" to thicken liquids he feeds to her but had a suspicion that she was aspirating still. He reports her having a cough and sometimes bringing up mucus, also that she was not tolerating all 3 meals a day, some days she would throw up her breakfast other days would throw up her dinner and vice versa. In ER pt was hypoxic and unresponsive and was intubated. CXR significant for bilateral infiltrates.  Patient was admitted for acute respiratory failure with multilobar aspiration pneumonia and admitted to the MICU.  Her condition improved on mechanical ventilation and she was extubated this morning.  Intensivist team had extensive goals of care conversation with the patient's son and he wishes for patient to be comfortable and doesn't want aggressive interventions taken going forward.  Patient is now DNR/DNI and is NPO due to high risk for aspiration.    REVIEW OF SYSTEMS:    unable to assess    Vital Signs Last 24 Hrs  T(C): 36.8 (09 Jun 2019 09:30), Max: 37 (08 Jun 2019 19:00)  T(F): 98.2 (09 Jun 2019 09:30), Max: 98.6 (08 Jun 2019 19:00)  HR: 69 (09 Jun 2019 10:00) (57 - 70)  BP: 91/56 (09 Jun 2019 10:00) (84/43 - 100/55)  BP(mean): 69 (09 Jun 2019 10:00) (61 - 75)  RR: 24 (09 Jun 2019 10:00) (7 - 26)  SpO2: 91% (09 Jun 2019 10:00) (91% - 100%)I&O's Summary    08 Jun 2019 07:01  -  09 Jun 2019 07:00  --------------------------------------------------------  IN: 4474.8 mL / OUT: 400 mL / NET: 4074.8 mL    09 Jun 2019 07:01  -  09 Jun 2019 11:54  --------------------------------------------------------  IN: 50 mL / OUT: 0 mL / NET: 50 mL    CAPILLARY BLOOD GLUCOSE    PHYSICAL EXAM:  GENERAL: NAD, awake and alert, but not following commands  HEENT: PERRL, +EOMI, anicteric  NECK: No JVD noted   CHEST/LUNG: scattered rhonchi bilaterally; Normal effort  HEART: S1S2 decreased intensity, no murmurs, gallops or rubs noted  ABDOMEN: Soft, BS Normoactive, NT, ND, no HSM noted  EXTREMITIES:  decreased pulses noted, no clubbing, cyanosis, or edema noted  SKIN: pressure pad noted on left hip - did not remove  NEURO: non-verbal, doesn't follow commands  PSYCH: flat affect; insight/judgement unable    LABS:                        9.5    10.7  )-----------( 174      ( 09 Jun 2019 02:29 )             29.3     06-09    144  |  104  |  29.0<H>  ----------------------------<  177<H>  3.4<L>   |  24.0  |  1.21    Ca    6.9<L>      09 Jun 2019 02:29  Phos  4.5     06-09  Mg     2.0     06-09    TPro  4.5<L>  /  Alb  2.2<L>  /  TBili  0.7  /  DBili  x   /  AST  20  /  ALT  11  /  AlkPhos  52  06-08    PT/INR - ( 07 Jun 2019 15:03 )   PT: 12.9 sec;   INR: 1.12 ratio         PTT - ( 07 Jun 2019 15:03 )  PTT:23.5 sec    RADIOLOGY & ADDITIONAL TESTS:    MEDICATIONS:  MEDICATIONS  (STANDING):  chlorhexidine 2% Cloths 1 Application(s) Topical <User Schedule>  levothyroxine 25 MICROGram(s) Oral daily  piperacillin/tazobactam IVPB. 3.375 Gram(s) IV Intermittent every 12 hours    MEDICATIONS  (PRN):  LORazepam   Injectable 0.5 milliGRAM(s) IV Push every 2 hours PRN Anxiety  morphine  - Injectable 2 milliGRAM(s) IV Push every 2 hours PRN dyspnea

## 2019-06-09 NOTE — DIETITIAN INITIAL EVALUATION ADULT. - PHYSICAL APPEARANCE
unable to obtain BMI, no height documented in chart. Pt cachetic, noted with severe muscle loss of temples and clavicles and severe fat loss of orbitals and buccal pads

## 2019-06-09 NOTE — PROGRESS NOTE ADULT - ATTENDING COMMENTS
time-limited trial of ventilation, continue to discuss with family.
Discussed with Reggie Nesbitt arvind, [phone] at 0800.  Extubate and promote comfort.  Hospice consultation.

## 2019-06-09 NOTE — CONSULT NOTE ADULT - PROBLEM SELECTOR RECOMMENDATION 9
extubated this morning  continue oxygen and antibiotics  no intubation if condition worsens   appreciate Intensivist management and transfer of service  hospice eval in am for end-stage multiinfarct dementia, dysphagia with severe protein calorie malnutrition, and no further bloodwork

## 2019-06-09 NOTE — DIETITIAN INITIAL EVALUATION ADULT. - OTHER INFO
89 y/o female with h/o CVA, dysphagia hypothyroidism admitted with hypoxic respiratory failure due to bilateral pneumonia most likely due to aspiration. Per H&P, pt needed thickened liquids at home, was not tolerating meals and some days would have emesis after meals. Pt now extubated, comfort measures initiated. Noted with suspected DTI to left hip, stage I to right hip, and stage II to left and right buttocks per documentation.

## 2019-06-09 NOTE — PROGRESS NOTE ADULT - PROBLEM SELECTOR PLAN 1
performed well on breathing trial  OK for extubation.  at high risk for recurrent respiratory failure as etiology has not improved (progressive dysphagia)

## 2019-06-10 NOTE — PROGRESS NOTE ADULT - SUBJECTIVE AND OBJECTIVE BOX
BEAU MCMAHON    [id number]    89y      Female    CC: respiratory failure    Overnight events:  Was seen and examined at bedside she opened eyes by her name. Was seen in Venti mask. Non verbal at baseline.    I spoke to patient son at bedside, all concern and questions were answered. Main goal is to make her comfortable. hospice team involved. fmaily meeting tomorrow at 8 am with hospice team and soN Jason.        HPI:  Information obtained from medical record and Intensivist team.  89 year old female with PMH CVA 2018, cerebral aneurysm 2009, who is cared for by son at home. Per son at bedside, she was in her usual state of health until morning of admission when he could not arouse her.  He noticed she was not breathing normally and put her in the car and brought her to the ER. Son states that he is her primary care giver at home and she is 100% dependent on him for care. He uses "Thick IT" to thicken liquids he feeds to her but had a suspicion that she was aspirating still. He reports her having a cough and sometimes bringing up mucus, also that she was not tolerating all 3 meals a day, some days she would throw up her breakfast other days would throw up her dinner and vice versa. In ER pt was hypoxic and unresponsive and was intubated. CXR significant for bilateral infiltrates.  Patient was admitted for acute respiratory failure with multilobar aspiration pneumonia and admitted to the MICU.  Her condition improved on mechanical ventilation and she was extubated this morning.  Intensivist team had extensive goals of care conversation with the patient's son and he wishes for patient to be comfortable and doesn't want aggressive interventions taken going forward.  Patient is now DNR/DNI and is NPO due to high risk for aspiration.    REVIEW OF SYSTEMS:    unable to assess      Vital Signs Last 24 Hrs  T(C): 36.5 (10 Don 2019 10:02), Max: 36.8 (09 Jun 2019 12:00)  T(F): 97.7 (10 Don 2019 10:02), Max: 98.3 (09 Jun 2019 18:22)  HR: 103 (10 Don 2019 10:02) (56 - 103)  BP: 144/80 (10 Don 2019 10:02) (94/62 - 144/80)  BP(mean): 71 (09 Jun 2019 12:00) (71 - 71)  RR: 18 (10 Don 2019 10:02) (18 - 22)  SpO2: 92% (10 Don 2019 10:02) (80% - 98%)      PHYSICAL EXAM:  GENERAL: NAD, awake and alert, but not following commands, on nasal cannula  HEENT: PERRL, +EOMI, anicteric  NECK: No JVD noted   CHEST/LUNG: scattered rhonchi bilaterally; Normal effort  HEART: S1S2 decreased intensity, no murmurs, gallops or rubs noted  ABDOMEN: Soft, BS Normoactive, NT, ND, no HSM noted  EXTREMITIES:  decreased pulses noted, no clubbing, cyanosis, or edema noted  SKIN: pressure pad noted on left hip - did not remove  NEURO: non-verbal, doesn't follow commands  PSYCH: flat affect; insight/judgement unable            LABS:                        9.5    10.7  )-----------( 174      ( 09 Jun 2019 02:29 )             29.3     06-09    144  |  104  |  29.0<H>  ----------------------------<  177<H>  3.4<L>   |  24.0  |  1.21    Ca    6.9<L>      09 Jun 2019 02:29  Phos  4.5     06-09  Mg     2.0     06-09              MEDICATIONS  (STANDING):  chlorhexidine 2% Cloths 1 Application(s) Topical <User Schedule>  levothyroxine Injectable 12.5 MICROGram(s) IV Push at bedtime  piperacillin/tazobactam IVPB. 3.375 Gram(s) IV Intermittent every 12 hours    MEDICATIONS  (PRN):  LORazepam   Injectable 0.5 milliGRAM(s) IV Push every 2 hours PRN Anxiety  morphine  - Injectable 2 milliGRAM(s) IV Push every 2 hours PRN dyspnea      RADIOLOGY & ADDITIONAL TESTS:

## 2019-06-10 NOTE — PROGRESS NOTE ADULT - ASSESSMENT
Assessment and Recommendation:   89 year old female with PMH CVA 2018, cerebral aneurysm 2009, came in for sob, found to have respiratory failure with multilobar bilateral aspiration pneumonia, intubated, extubated, transferred from MICU. , progressive dysphagia, acute on chronic aspiration. Family decided dnr, dni, comfort measure only, no feeding tube. MOLST form signed. Family interested in hospice [facility]. family meeting tomorrow at 8 am.         Problem/Recommendation - 1:  Problem: Acute respiratory failure with hypoxia.   s/p extubation on 6/9, was on venti mask, was switched to nasal cannula  continue oxygen and antibiotics day 4, blood culture negative, f/u sputum culture s/s. can be switched to po once c/s are back and speech and swallow evaluation  no re intubation if condition worsens   hospice eval in am for end-stage multiinfarct dementia, dysphagia with severe protein calorie malnutrition,     Problem/Recommendation - 2:  ·  Problem: Hypotension.  Recommendation: resolved.      Problem/Recommendation - 3:  ·  Problem: Aspiration pneumonia.  Recommendation: as above  NPO at this time. Will get speech and swallow evaluation. She was on puree with thick nectar at home. Family doesn;t want feeding tube  Pleasure feeds if she fails speech and swallow     Problem/Recommendation - 4:  ·  Problem: Hypernatremia.  Recommendation: resolved.      Problem/Recommendation - 5:  ·  Problem: JUAN FRANCISCO (acute kidney injury).  Recommendation: resolved.      Problem/Recommendation - 6:  Problem: History of CVA (cerebrovascular accident).     Problem/Recommendation - 7:  Problem: Dysphagia. Recommendation: swallow eval in am.    GOALS OF CARE:  I discussed goals of care with family SON Reggie Nesbitt and grand daughter. Family decided dnr, dni, comfort measure only, no sticking  needles of IV ACCESS, no feeding tube. Molst form signed  patient edgardo Rausch [phone] works in hospice inn at [place]. Director no is [phone] and are interested in transferring her to that facility. Hospice team informed, family meeting tomorrow at 8 am with hospice team  35 min spent in discussing goc discussion. Witnessed by patient RN Connor.    GOAL

## 2019-06-10 NOTE — GOALS OF CARE CONVERSATION - PERSONAL ADVANCE DIRECTIVE - CONVERSATION DETAILS
He has been caring for her for years at home.  Tried to enlist hospice at [facility] last admission but 'didn't qualify'.  Now wants comfort measures only and hospice referral -- he didn't think he can continue to care for her at home, becoming too challenging.
collaborated with Md Eliseo Kay   family is open to meet with hospice at 8 am 6/11. Hospice will meet with family and discuss services at that time .  Hospice will follow.

## 2019-06-11 NOTE — DISCHARGE NOTE NURSING/CASE MANAGEMENT/SOCIAL WORK - NSDCPEPTSTRK_GEN_ALL_CORE
Signs and symptoms of stroke/Prescribed medications/Need for follow up after discharge/Risk factors for stroke/Stroke education booklet/Stroke warning signs and symptoms/Call 911 for stroke/Stroke support groups for patients, families, and friends

## 2019-06-11 NOTE — PROGRESS NOTE ADULT - ASSESSMENT
Assessment and Recommendation:   89 year old female with PMH CVA 2018, cerebral aneurysm 2009, came in for sob, found to have respiratory failure with multilobar bilateral aspiration pneumonia, intubated, extubated, transferred from MICU. , progressive dysphagia, acute on chronic aspiration. Family decided dnr, dni, comfort measure only, no feeding tube. MOLST form signed. Family interested in hospice [facility]. Patient transferring to hospice [facility] today.         Problem/Recommendation - 1:  Problem: Acute respiratory failure with hypoxia.   s/p extubation on 6/9, was on venti mask, was switched to nasal cannula  continue oxygen and antibiotics day 45/10, blood culture negative, f/u sputum culture s/s. can be switched to po once c/s are back and speech and swallow evaluation  no re intubation if condition worsens   hospice eval for end-stage multiinfarct dementia, dysphagia with severe protein calorie malnutrition, transferring to hospice [facility] today.     Problem/Recommendation - 2:  ·  Problem: Hypotension.  Recommendation: resolved.      Problem/Recommendation - 3:  ·  Problem: Aspiration pneumonia.  Recommendation: as above  NPO at this time. Awaiting speech and swallow evaluation. She was on puree with thick nectar at home. Family doesn't want feeding tube  Pleasure feeds if she fails speech and swallow     Problem/Recommendation - 4:  ·  Problem: Hypernatremia.  Recommendation: resolved.      Problem/Recommendation - 5:  ·  Problem: JUAN FRANCISCO (acute kidney injury).  Recommendation: resolved.      Problem/Recommendation - 6:  Problem: History of CVA (cerebrovascular accident).     Problem/Recommendation - 7:  Problem: Dysphagia. Recommendation: swallow eval pending.    GOALS OF CARE:  I discussed goals of care with family SON Reggie Nesbitt and grand daughter. Family decided dnr, dni, comfort measure only, no sticking  needles of IV ACCESS, no feeding tube. Molst form signed  patient edgardo Rausch [phone] works in hospice [facility] at [place]. Director no is [phone] and are interested in transferring her to that facility. Hospice team informed, patient transferring to hospice [facility] today.

## 2019-06-11 NOTE — DISCHARGE NOTE NURSING/CASE MANAGEMENT/SOCIAL WORK - NSDCDPATPORTLINK_GEN_ALL_CORE
You can access the [facility] Patient Portal, offered by [facility], by registering with the following website: [email or web address]

## 2019-06-11 NOTE — DISCHARGE NOTE PROVIDER - HOSPITAL COURSE
89 year old female with PMH CVA 2018, cerebral aneurysm 2009, came in for sob, found to have respiratory failure with multilobar bilateral aspiration pneumonia, intubated, extubated, transferred from MICU. , progressive dysphagia, acute on chronic aspiration. Patient treated with IV antibiotics.  Family decided dnr, dni, comfort measure only, no feeding tube. MOLST form signed. Family interested in hosp[facility]. Patient transferring to hospice inn today. 89 year old female with PMH CVA 2018, cerebral aneurysm 2009, came in for sob, found to have respiratory failure with multilobar bilateral aspiration pneumonia, intubated, extubated, transferred from MICU. , progressive dysphagia, acute on chronic aspiration. Patient treated with IV antibiotics.  Family decided dnr, dni, comfort measure only, no feeding tube. MOLST form signed. Family interested in hospice [facility]. Patient transferring to [facility] today. Patient failed speech and swallow evaluation while in patient. Can have re evaluation at [facility] the facility for pleasure feeds. Patient son and fmaily are on agreement . sputum culture grew Klebsiella pna and needs antibiotics total 10 days treatment till 6/16.            PHYSICAL EXAM:    GENERAL: NAD, open eyes by name, on nasal cannula, doesn't follow commands    HEAD:  Atraumatic, Normocephalic    NECK: Supple, No JVD, Normal thyroid    NERVOUS SYSTEM:  Alert, minimally verbal, generalized weakness    CHEST/LUNG: Diminished BS     HEART: Regular rate and rhythm;     ABDOMEN: Soft, Nontender, Nondistended; Bowel sounds present    EXTREMITIES:, No clubbing, cyanosis, or edema            35 min spent in discharging the patient.

## 2019-06-11 NOTE — PROGRESS NOTE ADULT - SUBJECTIVE AND OBJECTIVE BOX
CC: respiratory failure (10 Don 2019 11:50)    HPI:  89 year old female with PMH CVA 2018, cerebral aneurysm 2009, who is cared for by son at home. Per son at bedside, she was in her usual state of health until when he could not arouse her, he tried to do things to stimulate her, he changed her and noticed she was not breathing normal and put her in the car and brought her to the ER. Son states that he is her primary care giver at home and she is 100% dependent on him for care. He uses "Thick IT" to thicken liquids he feeds to her but had a suspicion that she was aspirating still. He reports her having a cough and sometimes bringing up mucus, also that she was not tolerating all 3 meals a day, some days she would throw up her breakfast other days would throw up her dinner and vice versa. In ER pt was hypoxic and unresponsive and was intubated. CXR significant for bilateral infiltrates. Chemistry reveals hypernatremia, JUAN FRANCISCO (07 Jun 2019 18:55)    INTERVAL HPI/OVERNIGHT EVENTS: Patient seen and examined lying in bed with family at bedside. Patient nonverbal at present, as per son at bedside, she speaks when she wants to.  Patient unable to answer ROS.  Appears comfortable.    Vital Signs Last 24 Hrs  T(C): 36.7 (11 Jun 2019 08:00), Max: 36.7 (11 Jun 2019 08:00)  T(F): 98.1 (11 Jun 2019 08:00), Max: 98.1 (11 Jun 2019 08:00)  HR: 60 (11 Jun 2019 08:00) (59 - 91)  BP: 110/70 (11 Jun 2019 08:00) (102/72 - 110/70)  BP(mean): --  RR: 18 (11 Jun 2019 08:00) (18 - 18)  SpO2: 100% (11 Jun 2019 08:00) (90% - 100%)  I&O's Detail      PHYSICAL EXAM:  GENERAL: NAD  HEAD:  Atraumatic, Normocephalic  NECK: Supple, No JVD, Normal thyroid  NERVOUS SYSTEM:  Alert, minimally verbal, generalized weakness  CHEST/LUNG: Diminished BS   HEART: Regular rate and rhythm; No murmurs, rubs, or gallops  ABDOMEN: Soft, Nontender, Nondistended; Bowel sounds present  EXTREMITIES:  2+ Peripheral Pulses, No clubbing, cyanosis, or edema      MEDICATIONS  (STANDING):  chlorhexidine 2% Cloths 1 Application(s) Topical <User Schedule>  levothyroxine Injectable 12.5 MICROGram(s) IV Push at bedtime  piperacillin/tazobactam IVPB. 3.375 Gram(s) IV Intermittent every 12 hours    MEDICATIONS  (PRN):  LORazepam   Injectable 0.5 milliGRAM(s) IV Push every 2 hours PRN Anxiety  morphine  - Injectable 2 milliGRAM(s) IV Push every 2 hours PRN dyspnea

## 2019-06-11 NOTE — DISCHARGE NOTE PROVIDER - NSDCCPCAREPLAN_GEN_ALL_CORE_FT
PRINCIPAL DISCHARGE DIAGNOSIS  Diagnosis: Pneumonia  Assessment and Plan of Treatment: Complete antibiotics.      SECONDARY DISCHARGE DIAGNOSES  Diagnosis: Hypoxia  Assessment and Plan of Treatment: O2 supplementation.  Hospice care. PRINCIPAL DISCHARGE DIAGNOSIS  Diagnosis: Pneumonia  Assessment and Plan of Treatment: Complete antibiotics till 6/16      SECONDARY DISCHARGE DIAGNOSES  Diagnosis: Hypoxia  Assessment and Plan of Treatment: O2 supplementation.  Hospice care.

## 2019-06-30 PROBLEM — I10 ESSENTIAL (PRIMARY) HYPERTENSION: Chronic | Status: ACTIVE | Noted: 2019-01-01

## 2019-06-30 PROBLEM — I63.9 CEREBRAL INFARCTION, UNSPECIFIED: Chronic | Status: ACTIVE | Noted: 2019-01-01

## 2019-06-30 NOTE — ED PROVIDER NOTE - OBJECTIVE STATEMENT
89 y/o F with .pm CVA presents to ED BIBA with son c/o altered mental status. Pt's baseline is nonverbal since a stroke in december. Son states that pt was last seen normal 5 1/2 hours ago. Began to glare blankly, reached out for son, has a different facial expression, did not tolerate PO. 10 days ago pt was discharged from [facility] to home hospice after being dx with PNA (has a line for abx).

## 2019-06-30 NOTE — H&P ADULT - HISTORY OF PRESENT ILLNESS
89 y/o female with PMH of CVA, HTN, hypothyroidism was brought to the ED for altered mental status. As per daughter in law at bed side, patient has been non-verbal since December 2018 after a stroke. Today, patient reportedly has a right gaze and reached out to hold her son also mumbled a word. Patient has not been able to move any of her ext. for many months. She was noted to have non-bloody emesis as well yesterday. She was recently seen in [facility] for pneumonia and discharged to hospice. Patient has been refusing PO since in hospice. She was discharge to home hospice on Thursday. Patient also noted to have cough on Thursday, CXR done and found to have pneumonia again; started on Levaquin 500mg daily last dose tomorrow. No fever, chills, reported.      As per daughter in law, all medications stopped when she went to hospice.

## 2019-06-30 NOTE — ED ADULT NURSE NOTE - CHIEF COMPLAINT QUOTE
pt AMS as per family, reaching out. pt not acting right since 7 pm, MD called to bedside. priority CT called

## 2019-06-30 NOTE — ED ADULT TRIAGE NOTE - CHIEF COMPLAINT QUOTE
pt AMS as per family, reaching out. pt AMS as per family, reaching out. pt not acting right since 7 pm, MD called to bedside. priority CT called

## 2019-06-30 NOTE — ED PROVIDER NOTE - CLINICAL SUMMARY MEDICAL DECISION MAKING FREE TEXT BOX
89 y/o F with .pmh CVA presents to ED BIBA with son c/o altered mental status - no stroke code called bc out of the window - and unclear of baseline mental status - pt found to be hypoNA, hypoK, elevated trop, CTH no acute findings - admit for further management

## 2019-06-30 NOTE — ED ADULT NURSE NOTE - OBJECTIVE STATEMENT
per family pt was not acting like her self called ambulance per family pt vommited multiple times, per family pt has not done anything with arms and started moving arms per family they were concerned about change in mental status

## 2019-06-30 NOTE — ED PROVIDER NOTE - CARE PLAN
Principal Discharge DX:	Hypernatremia  Secondary Diagnosis:	Elevated troponin  Secondary Diagnosis:	Altered mental status

## 2019-06-30 NOTE — ED ADULT NURSE REASSESSMENT NOTE - NS ED NURSE REASSESS COMMENT FT1
pt status unchanged, refer to flowsheet and chart, pt safety maintained, pt hemodynamically stable, no change in pt mental status or status, will continue to monitor

## 2019-06-30 NOTE — ED PROVIDER NOTE - PHYSICAL EXAMINATION
Const: Awake, staring  HEENT: NC/AT. Moist mucous membranes.  Eyes: No scleral icterus. EOMI.   Neck:. Soft and supple. Full ROM without pain.  Cardiac: Regular rate and irregular rhythm. +S1/S2. Peripheral pulses 2+ and symmetric. No LE edema.  Resp:  No evidence of respiratory distress. No wheezes, rales or rhonchi.  Abd: Soft, non-tender, non-distended. Normal bowel sounds in all 4 quadrants. No guarding or rebound.  Back: Spine midline and non-tender.   Skin: No rashes, abrasions or lacerations.  Neuro: Awake, at baseline pt non verbal and does not fully follow commands

## 2019-06-30 NOTE — H&P ADULT - ASSESSMENT
89 y/o female with PMH of CVA, HTN, hypothyroidism was brought to the ED for altered mental status.     Acute encephalopathy likely 2/2 electrolyte imbalance   Admit to monitor bed  CT head: no acute intracranial finding     Hypernatremia 2/2 decrease PO   Na: 162  Patient started on D5W   Monitor BMP    Elevated troponin  Troponin: 0.09  No ECG change   Will trend for now     Hypokalemia   K: 3.1   Supplemented   Monitor BMP    Pneumonia   Continue Levaquin 500mg x 2 more days    Leukocytosis   Likely 2/2 PNA   Continue antibiotic   Monitor CBC    Hypothyroidism   No longer on medication     HTN   BP at goal   Not on any medication     Supportive   DVT prophylaxis: CSD   Diet: NPO for now; advance as needed     Patient is DNR but not DNI.

## 2019-06-30 NOTE — CHART NOTE - NSCHARTNOTEFT_GEN_A_CORE
Patient seen and examined in ED , comfortable , non verbal . Chart reviewed , admitted for AMS likely secondary to PNA / electrolytes imbalance / dehydration             LABS:                          9.9    12.17 )-----------( 196      ( 30 Jun 2019 11:58 )             31.1     06-30    157<H>  |  114<H>  |  17.0  ----------------------------<  136<H>  2.7<LL>   |  31.0<H>  |  0.91    Ca    7.6<L>      30 Jun 2019 14:13  Mg     2.0     06-30    TPro  5.8<L>  /  Alb  2.3<L>  /  TBili  0.5  /  DBili  x   /  AST  16  /  ALT  7   /  AlkPhos  85  06-30    PT/INR - ( 30 Jun 2019 05:01 )   PT: 14.4 sec;   INR: 1.24 ratio         PTT - ( 30 Jun 2019 05:01 )  PTT:27.7 sec      RADIOLOGY & ADDITIONAL TESTS:    < from: Xray Chest 1 View- PORTABLE-Urgent (06.30.19 @ 03:39) >       EXAM:  XR CHEST PORTABLE URGENT 1V                          PROCEDURE DATE:  06/30/2019          INTERPRETATION:  HISTORY: ; ; Stroke Code;  TECHNIQUE: Portable frontal view of the chest, 1 view.  COMPARISON: 6/7/2019.  FINDINGS:     HEART: normalin size   LUNGS: Improved aeration in the left upper lobe. Left basilar and right   parahilar infiltrates.    OSSEOUS STRUCTURES:: degenerative changes    Cholecystectomy clips.    IMPRESSION:   Left basilar and right parahilar infiltrates..        BARRON BEAVER M.D., ATTENDING RADIOLOGIST  This document has been electronically signed. Jun 30 2019 11:03AM    < end of copied text >    < from: CT Head No Cont (06.30.19 @ 00:56) >       EXAM:  CT BRAIN                          PROCEDURE DATE:  06/30/2019          INTERPRETATION:  NONCONTRAST CT OF THE BRAIN DATED 6/30/2019.    CLINICAL INFORMATION:  Change in mental status    TECHNIQUE: Axial CT images are obtained from the cranial vertex to the   skull base without the administration of IV contrast. Images are   reformatted in sagittal and coronal planes.    The study is correlated with a prior exam from 6/7/2019.    FINDINGS:    The study is mildly degraded by motion. Evaluation is further degraded by   streak artifact from external apparatus. There is no gross acute   intra-axial or extra-axial hemorrhage. There is no significant mass   effect or shift of the midline. There is cerebral and cerebellar volume   loss withprominence of the ventricles, sulci, and cerebellar folia. A   CSF filled sella turcica is noted. Extensive chronic small vessel   ischemic changes are seen in the frontoparietal white matter. There are   old left parietal and right paramedian frontal cortical infarcts and   scattered white matter infarcts, unchanged. There are chronic bilateral   thalamic infarcts. There are atherosclerotic calcifications of the   intracranial carotid and intradural vertebral arteries.    The regional soft tissues and osseous structures are unremarkable. The   visualized paranasal sinuses and tympanic/mastoid cavities are clear   apart from minimal ethmoid mucosal thickening and trace left mastoid   effusion.    IMPRESSION:    No acute intracranial hemorrhage or mass effect. Extensive chronic   ischemic changes, as described. No interval change compared with prior   exam.        CLAUDIA HARP D.O. ATTENDING RADIOLOGIST  This document has been electronically signed. Jun 30 2019  1:18AM        < end of copied text >    Lactate, Blood (06.08.19 @ 06:49)    Lactate, Blood: 3.6: TYPE:(C=Critical, N=Notification, A=Abnormal) N  TESTS: _LACTATE  DATE/TIME CALLED: _06/08/19 07:27  CALLED TO: _KARO PINON  READ BACK (2 Patient Identifiers)(Y/N): _Y  READ BACK VALUES (Y/N): Y_  CALLED BY: N REID_ mmol/L    Blood Gas Venous - Lactate (06.07.19 @ 22:29)    Blood Gas Venous - Lactate: 3.3 mmoL/L            Vital Signs Last 24 Hrs  T(C): 36.8 (30 Jun 2019 15:22), Max: 36.8 (30 Jun 2019 15:22)  T(F): 98.2 (30 Jun 2019 15:22), Max: 98.2 (30 Jun 2019 15:22)  HR: 76 (30 Jun 2019 15:22) (71 - 83)  BP: 122/68 (30 Jun 2019 15:22) (96/54 - 122/68)  BP(mean): --  RR: 18 (30 Jun 2019 15:22) (12 - 18)  SpO2: 100% (30 Jun 2019 15:22) (97% - 100%)  PHYSICAL EXAM:    GENERAL: NAD, well-groomed, well-developed  HEAD:  Atraumatic, Normocephalic  EYES: EOMI, PERRLA, conjunctiva and sclera clear  NECK: Supple, No JVD, Normal thyroid  NERVOUS SYSTEM:  Alert & Oriented X3, no focal deficit  CHEST/LUNG: CTA b/l ,  no  rales, rhonchi, wheezing, or rubs  HEART: Regular rate and rhythm; No murmurs, rubs, or gallops  ABDOMEN: Soft, Nontender, Nondistended; Bowel sounds present  EXTREMITIES:  2+ Peripheral Pulses, No clubbing, cyanosis, or edema  LYMPH: No lymphadenopathy noted  SKIN: No rashes or lesions    89 y/o female with PMH of CVA, HTN, hypothyroidism , on Hospice was brought  to the ED for altered mental status. Patient seen and examined in ED , comfortable , non verbal . Chart reviewed , admitted for AMS likely secondary to PNA / electrolytes imbalance / dehydration             LABS:                          9.9    12.17 )-----------( 196      ( 30 Jun 2019 11:58 )             31.1     06-30    157<H>  |  114<H>  |  17.0  ----------------------------<  136<H>  2.7<LL>   |  31.0<H>  |  0.91    Ca    7.6<L>      30 Jun 2019 14:13  Mg     2.0     06-30    TPro  5.8<L>  /  Alb  2.3<L>  /  TBili  0.5  /  DBili  x   /  AST  16  /  ALT  7   /  AlkPhos  85  06-30    PT/INR - ( 30 Jun 2019 05:01 )   PT: 14.4 sec;   INR: 1.24 ratio         PTT - ( 30 Jun 2019 05:01 )  PTT:27.7 sec      RADIOLOGY & ADDITIONAL TESTS:    < from: Xray Chest 1 View- PORTABLE-Urgent (06.30.19 @ 03:39) >       EXAM:  XR CHEST PORTABLE URGENT 1V                          PROCEDURE DATE:  06/30/2019          INTERPRETATION:  HISTORY: ; ; Stroke Code;  TECHNIQUE: Portable frontal view of the chest, 1 view.  COMPARISON: 6/7/2019.  FINDINGS:     HEART: normalin size   LUNGS: Improved aeration in the left upper lobe. Left basilar and right   parahilar infiltrates.    OSSEOUS STRUCTURES:: degenerative changes    Cholecystectomy clips.    IMPRESSION:   Left basilar and right parahilar infiltrates..        BARRON BEAVER M.D., ATTENDING RADIOLOGIST  This document has been electronically signed. Jun 30 2019 11:03AM    < end of copied text >    < from: CT Head No Cont (06.30.19 @ 00:56) >       EXAM:  CT BRAIN                          PROCEDURE DATE:  06/30/2019          INTERPRETATION:  NONCONTRAST CT OF THE BRAIN DATED 6/30/2019.    CLINICAL INFORMATION:  Change in mental status    TECHNIQUE: Axial CT images are obtained from the cranial vertex to the   skull base without the administration of IV contrast. Images are   reformatted in sagittal and coronal planes.    The study is correlated with a prior exam from 6/7/2019.    FINDINGS:    The study is mildly degraded by motion. Evaluation is further degraded by   streak artifact from external apparatus. There is no gross acute   intra-axial or extra-axial hemorrhage. There is no significant mass   effect or shift of the midline. There is cerebral and cerebellar volume   loss withprominence of the ventricles, sulci, and cerebellar folia. A   CSF filled sella turcica is noted. Extensive chronic small vessel   ischemic changes are seen in the frontoparietal white matter. There are   old left parietal and right paramedian frontal cortical infarcts and   scattered white matter infarcts, unchanged. There are chronic bilateral   thalamic infarcts. There are atherosclerotic calcifications of the   intracranial carotid and intradural vertebral arteries.    The regional soft tissues and osseous structures are unremarkable. The   visualized paranasal sinuses and tympanic/mastoid cavities are clear   apart from minimal ethmoid mucosal thickening and trace left mastoid   effusion.    IMPRESSION:    No acute intracranial hemorrhage or mass effect. Extensive chronic   ischemic changes, as described. No interval change compared with prior   exam.        CLAUDIA HARP D.O. ATTENDING RADIOLOGIST  This document has been electronically signed. Jun 30 2019  1:18AM        < end of copied text >    Lactate, Blood (06.08.19 @ 06:49)    Lactate, Blood: 3.6: TYPE:(C=Critical, N=Notification, A=Abnormal) N  TESTS: _LACTATE  DATE/TIME CALLED: _06/08/19 07:27  CALLED TO: _KARO PINON  READ BACK (2 Patient Identifiers)(Y/N): _Y  READ BACK VALUES (Y/N): Y_  CALLED BY: N REID_ mmol/L    Blood Gas Venous - Lactate (06.07.19 @ 22:29)    Blood Gas Venous - Lactate: 3.3 mmoL/L            Vital Signs Last 24 Hrs  T(C): 36.8 (30 Jun 2019 15:22), Max: 36.8 (30 Jun 2019 15:22)  T(F): 98.2 (30 Jun 2019 15:22), Max: 98.2 (30 Jun 2019 15:22)  HR: 76 (30 Jun 2019 15:22) (71 - 83)  BP: 122/68 (30 Jun 2019 15:22) (96/54 - 122/68)  BP(mean): --  RR: 18 (30 Jun 2019 15:22) (12 - 18)  SpO2: 100% (30 Jun 2019 15:22) (97% - 100%)  PHYSICAL EXAM:    GENERAL: NAD, well-groomed, well-developed  HEAD:  Atraumatic, Normocephalic  EYES: EOMI, PERRLA, conjunctiva and sclera clear  NECK: Supple, No JVD, Normal thyroid  NERVOUS SYSTEM:  Alert & Oriented X3, no focal deficit  CHEST/LUNG: CTA b/l ,  no  rales, rhonchi, wheezing, or rubs  HEART: Regular rate and rhythm; No murmurs, rubs, or gallops  ABDOMEN: Soft, Nontender, Nondistended; Bowel sounds present  EXTREMITIES:  2+ Peripheral Pulses, No clubbing, cyanosis, or edema  LYMPH: No lymphadenopathy noted  SKIN: No rashes or lesions    89 y/o female with PMH of CVA, HTN, hypothyroidism , non verbal , bed bound , on Hospice was brought  to the ED for altered mental status.  Found to be dehydrated  , hypokalemic , with  PNA . On ivf , potassium supplemented , continue iv abx ,  NPO , speech eval , r/o aspiration  . Mildly elevated troponin .   d/w son Mr Laz Paredes - wants only comfort care ,  treat infection , ivf . Aware op elevated troponin - no further w/u to be done . Will d/c monitor bed . Patient seen and examined in ED , comfortable , non verbal . Chart reviewed , admitted for AMS likely secondary to PNA / electrolytes imbalance / dehydration             LABS:                          9.9    12.17 )-----------( 196      ( 30 Jun 2019 11:58 )             31.1     06-30    157<H>  |  114<H>  |  17.0  ----------------------------<  136<H>  2.7<LL>   |  31.0<H>  |  0.91    Ca    7.6<L>      30 Jun 2019 14:13  Mg     2.0     06-30    TPro  5.8<L>  /  Alb  2.3<L>  /  TBili  0.5  /  DBili  x   /  AST  16  /  ALT  7   /  AlkPhos  85  06-30    PT/INR - ( 30 Jun 2019 05:01 )   PT: 14.4 sec;   INR: 1.24 ratio         PTT - ( 30 Jun 2019 05:01 )  PTT:27.7 sec      RADIOLOGY & ADDITIONAL TESTS:    < from: Xray Chest 1 View- PORTABLE-Urgent (06.30.19 @ 03:39) >       EXAM:  XR CHEST PORTABLE URGENT 1V                          PROCEDURE DATE:  06/30/2019          INTERPRETATION:  HISTORY: ; ; Stroke Code;  TECHNIQUE: Portable frontal view of the chest, 1 view.  COMPARISON: 6/7/2019.  FINDINGS:     HEART: normalin size   LUNGS: Improved aeration in the left upper lobe. Left basilar and right   parahilar infiltrates.    OSSEOUS STRUCTURES:: degenerative changes    Cholecystectomy clips.    IMPRESSION:   Left basilar and right parahilar infiltrates..        BARRON BEAVER M.D., ATTENDING RADIOLOGIST  This document has been electronically signed. Jun 30 2019 11:03AM    < end of copied text >    < from: CT Head No Cont (06.30.19 @ 00:56) >       EXAM:  CT BRAIN                          PROCEDURE DATE:  06/30/2019          INTERPRETATION:  NONCONTRAST CT OF THE BRAIN DATED 6/30/2019.    CLINICAL INFORMATION:  Change in mental status    TECHNIQUE: Axial CT images are obtained from the cranial vertex to the   skull base without the administration of IV contrast. Images are   reformatted in sagittal and coronal planes.    The study is correlated with a prior exam from 6/7/2019.    FINDINGS:    The study is mildly degraded by motion. Evaluation is further degraded by   streak artifact from external apparatus. There is no gross acute   intra-axial or extra-axial hemorrhage. There is no significant mass   effect or shift of the midline. There is cerebral and cerebellar volume   loss withprominence of the ventricles, sulci, and cerebellar folia. A   CSF filled sella turcica is noted. Extensive chronic small vessel   ischemic changes are seen in the frontoparietal white matter. There are   old left parietal and right paramedian frontal cortical infarcts and   scattered white matter infarcts, unchanged. There are chronic bilateral   thalamic infarcts. There are atherosclerotic calcifications of the   intracranial carotid and intradural vertebral arteries.    The regional soft tissues and osseous structures are unremarkable. The   visualized paranasal sinuses and tympanic/mastoid cavities are clear   apart from minimal ethmoid mucosal thickening and trace left mastoid   effusion.    IMPRESSION:    No acute intracranial hemorrhage or mass effect. Extensive chronic   ischemic changes, as described. No interval change compared with prior   exam.        CLAUDIA HARP D.O. ATTENDING RADIOLOGIST  This document has been electronically signed. Jun 30 2019  1:18AM        < end of copied text >    Lactate, Blood (06.08.19 @ 06:49)    Lactate, Blood: 3.6: TYPE:(C=Critical, N=Notification, A=Abnormal) N  TESTS: _LACTATE  DATE/TIME CALLED: _06/08/19 07:27  CALLED TO: _KARO PINON  READ BACK (2 Patient Identifiers)(Y/N): _Y  READ BACK VALUES (Y/N): Y_  CALLED BY: N REID_ mmol/L    Blood Gas Venous - Lactate (06.07.19 @ 22:29)    Blood Gas Venous - Lactate: 3.3 mmoL/L            Vital Signs Last 24 Hrs  T(C): 36.8 (30 Jun 2019 15:22), Max: 36.8 (30 Jun 2019 15:22)  T(F): 98.2 (30 Jun 2019 15:22), Max: 98.2 (30 Jun 2019 15:22)  HR: 76 (30 Jun 2019 15:22) (71 - 83)  BP: 122/68 (30 Jun 2019 15:22) (96/54 - 122/68)  BP(mean): --  RR: 18 (30 Jun 2019 15:22) (12 - 18)  SpO2: 100% (30 Jun 2019 15:22) (97% - 100%)  PHYSICAL EXAM:    GENERAL: NAD, well-groomed, well-developed  HEAD:  Atraumatic, Normocephalic  EYES: EOMI, PERRLA, conjunctiva and sclera clear  NECK: Supple, No JVD, Normal thyroid  NERVOUS SYSTEM:  Alert & Oriented X3, no focal deficit  CHEST/LUNG: CTA b/l ,  no  rales, rhonchi, wheezing, or rubs  HEART: Regular rate and rhythm; No murmurs, rubs, or gallops  ABDOMEN: Soft, Nontender, Nondistended; Bowel sounds present  EXTREMITIES:  2+ Peripheral Pulses, No clubbing, cyanosis, or edema  LYMPH: No lymphadenopathy noted  SKIN: No rashes or lesions    89 y/o female with PMH of CVA, HTN, hypothyroidism , non verbal , bed bound , on Hospice was brought  to the ED for altered mental status.  Found to be dehydrated  , hypokalemic , with  PNA . On ivf , potassium supplemented , continue iv abx ,  NPO , speech eval , r/o aspiration  . Mildly elevated troponin . Check UA / urine cultures , follow labs in am .   d/w son Mr Laz Paredes - wants only comfort care ,  treat infection , ivf . Aware op elevated troponin - no further w/u to be done . Will d/c monitor bed .

## 2019-07-01 NOTE — SWALLOW BEDSIDE ASSESSMENT ADULT - ADDITIONAL RECOMMENDATIONS
*Ongoing discussion with MD/family regarding pt/family wishes regarding nutrition/hydration  *Consideration for comfort feeding as pt on hospice care with family request for comfort measures only per MD note.

## 2019-07-01 NOTE — SWALLOW BEDSIDE ASSESSMENT ADULT - SWALLOW EVAL: DIAGNOSIS
Severe oral dysphagia marked by reduced attention to bolus, absent bolus manipulation with no attempt to form/manipulate bolus or propel posteriorly. Bolus remain on lingual blade, removed from pt's mouth by this clinician. Unable to assess pharyngeal stage of swallow as no swallow triggered.

## 2019-07-01 NOTE — SWALLOW BEDSIDE ASSESSMENT ADULT - SLP GENERAL OBSERVATIONS
Pt received awake in bed, verbally stated "hi," however subsequently only produced jargon like utterances, poor cognition, unable to follow commands, +02 via nc, open mouth posture, dry oral mucosa

## 2019-07-01 NOTE — SWALLOW BEDSIDE ASSESSMENT ADULT - SLP PERTINENT HISTORY OF CURRENT PROBLEM
Pt is a 89 year old female brought to ED for AMS and found with dehydration, hypokalemia and PNA. PMH remarkable for CVA (12/18) HTN, bedbound on hospice. Per MD note, pt was recently seen in [facility] for pneumonia and discharged to hospice. Patient has been refusing PO since in hospice. She was discharge to home hospice on Thursday. Patient also noted to have cough on Thursday, CXR done and found to have pneumonia again;

## 2019-07-01 NOTE — SWALLOW BEDSIDE ASSESSMENT ADULT - ASR SWALLOW ASPIRATION MONITOR
change of breathing pattern/position upright (89Y)/throat clearing/cough/gurgly voice/oral hygiene/fever/pneumonia

## 2019-07-01 NOTE — PROGRESS NOTE ADULT - SUBJECTIVE AND OBJECTIVE BOX
CC:    Patient seen and examined at the bedside. No acute overnight events. - yesterday. Complaining of - today. Denies fever/chills, headache, lightheadedness, dizziness, chest pain, palpitations, shortness of breath, cough, abd pain, nausea/vomiting/diarrhea, muscle pain.      =========================================================================================  T(C): 36.4 (07-01-19 @ 05:04), Max: 36.8 (06-30-19 @ 15:22)  HR: 78 (07-01-19 @ 05:04) (73 - 78)  BP: 115/69 (07-01-19 @ 05:04) (108/56 - 139/68)  RR: 18 (07-01-19 @ 05:04) (16 - 18)  SpO2: 100% (07-01-19 @ 05:04) (97% - 100%)    PHYSICAL EXAM.    GEN - appears age appropriate. well nourished. pleasant. no distress.   HEENT - NCAT, EOMI, CATHI  RESP - CTA BL, no wheeze/stridor/rhonchi/crackles. not on supplemental O2. able to speak in full sentences without distress.   CARDIO - NS1S2, RRR. No murmurs/rubs/gallops.  ABD - Soft/Non tender/Non distended. Normal BS x4 quadrants. no guarding/rebound tenderness.  Ext - No HUMZA.  MSK - BL 5/5 strength on upper and lower extremities.   Neuro - AAOx3. cn 2-12 grossly intact  Psych - normal affect  Skin - c/d/i. no rashes/lesions      I&O's Summary    Daily     Daily     =========================================================================================  LABS.    06-30    157<H>  |  114<H>  |  17.0  ----------------------------<  136<H>  2.7<LL>   |  31.0<H>  |  0.91    Ca    7.6<L>      30 Jun 2019 14:13  Mg     2.0     06-30    TPro  5.8<L>  /  Alb  2.3<L>  /  TBili  0.5  /  DBili  x   /  AST  16  /  ALT  7   /  AlkPhos  85  06-30                          10.4   13.36 )-----------( 217      ( 01 Jul 2019 09:03 )             33.8     LIVER FUNCTIONS - ( 30 Jun 2019 01:15 )  Alb: 2.3 g/dL / Pro: 5.8 g/dL / ALK PHOS: 85 U/L / ALT: 7 U/L / AST: 16 U/L / GGT: x           PT/INR - ( 30 Jun 2019 05:01 )   PT: 14.4 sec;   INR: 1.24 ratio         PTT - ( 30 Jun 2019 05:01 )  PTT:27.7 sec  CARDIAC MARKERS ( 30 Jun 2019 14:13 )  x     / x     / 91 U/L / x     / x      CARDIAC MARKERS ( 30 Jun 2019 11:58 )  x     / 0.08 ng/mL / x     / x     / x      CARDIAC MARKERS ( 30 Jun 2019 01:15 )  x     / 0.09 ng/mL / x     / x     / x                =========================================================================================  IMAGING.     =========================================================================================    HOME MEDS.    Home Medications:  levothyroxine: 12.5 microgram(s) intravenous once a day (at bedtime) (11 Jun 2019 10:47)  LORazepam: 0.5 milligram(s) intravenous every 2 hours, As Needed Anxiety (11 Jun 2019 10:47)  morphine: 2 milligram(s) intravenous every 2 hours, As Needed SOB (11 Jun 2019 10:47)  piperacillin-tazobactam 2 g-0.25 g intravenous injection: 3.375 gram(s) intravenous every 12 hours for 6 more days till 6/16 (11 Jun 2019 11:30)      =========================================================================================    HOSPITAL MEDS.    MEDICATIONS  (STANDING):  dextrose 5% + sodium chloride 0.45% with potassium chloride 20 mEq/L 1000 milliLiter(s) (100 mL/Hr) IV Continuous <Continuous>  heparin  Injectable 5000 Unit(s) SubCutaneous every 8 hours    MEDICATIONS  (PRN): CC: ams    Patient seen and examined at the bedside. No acute overnight events. admitted yesterday. Complaining of nothing today however pt w/ very limited verbal responses, unable to assess full ROS at length.      =========================================================================================  T(C): 36.4 (07-01-19 @ 05:04), Max: 36.8 (06-30-19 @ 15:22)  HR: 78 (07-01-19 @ 05:04) (73 - 78)  BP: 115/69 (07-01-19 @ 05:04) (108/56 - 139/68)  RR: 18 (07-01-19 @ 05:04) (16 - 18)  SpO2: 100% (07-01-19 @ 05:04) (97% - 100%)    PHYSICAL EXAM.    GEN - appears age appropriate. frail. pleasant. no distress.   HEENT - NCAT, EOMI, CATHI. BL cataracts  RESP - on supplemental O2. unable to speak in full sentences   Neuro - Awake, alert, difficult to assess orientation as pt w/ limited verbal responses. no clear gross signs of sensory or motor deficits  Psych - normal affect      I&O's Summary    Daily     Daily     =========================================================================================  LABS.    06-30    157<H>  |  114<H>  |  17.0  ----------------------------<  136<H>  2.7<LL>   |  31.0<H>  |  0.91    Ca    7.6<L>      30 Jun 2019 14:13  Mg     2.0     06-30    TPro  5.8<L>  /  Alb  2.3<L>  /  TBili  0.5  /  DBili  x   /  AST  16  /  ALT  7   /  AlkPhos  85  06-30                          10.4   13.36 )-----------( 217      ( 01 Jul 2019 09:03 )             33.8     LIVER FUNCTIONS - ( 30 Jun 2019 01:15 )  Alb: 2.3 g/dL / Pro: 5.8 g/dL / ALK PHOS: 85 U/L / ALT: 7 U/L / AST: 16 U/L / GGT: x           PT/INR - ( 30 Jun 2019 05:01 )   PT: 14.4 sec;   INR: 1.24 ratio         PTT - ( 30 Jun 2019 05:01 )  PTT:27.7 sec  CARDIAC MARKERS ( 30 Jun 2019 14:13 )  x     / x     / 91 U/L / x     / x      CARDIAC MARKERS ( 30 Jun 2019 11:58 )  x     / 0.08 ng/mL / x     / x     / x      CARDIAC MARKERS ( 30 Jun 2019 01:15 )  x     / 0.09 ng/mL / x     / x     / x                =========================================================================================  IMAGING.     < from: CT Head No Cont (06.30.19 @ 00:56) >  FINDINGS:    The study is mildly degraded by motion. Evaluation is further degraded by   streak artifact from external apparatus. There is no gross acute   intra-axial or extra-axial hemorrhage. There is no significant mass   effect or shift of the midline. There is cerebral and cerebellar volume   loss withprominence of the ventricles, sulci, and cerebellar folia. A   CSF filled sella turcica is noted. Extensive chronic small vessel   ischemic changes are seen in the frontoparietal white matter. There are   old left parietal and right paramedian frontal cortical infarcts and   scattered white matter infarcts, unchanged. There are chronic bilateral   thalamic infarcts. There are atherosclerotic calcifications of the   intracranial carotid and intradural vertebral arteries.    The regional soft tissues and osseous structures are unremarkable. The   visualized paranasal sinuses and tympanic/mastoid cavities are clear   apart from minimal ethmoid mucosal thickening and trace left mastoid   effusion.    IMPRESSION:    No acute intracranial hemorrhage or mass effect. Extensive chronic   ischemic changes, as described. No interval change compared with prior   exam.    < end of copied text >    =========================================================================================    HOME MEDS.    Home Medications:  levothyroxine: 12.5 microgram(s) intravenous once a day (at bedtime) (11 Jun 2019 10:47)  LORazepam: 0.5 milligram(s) intravenous every 2 hours, As Needed Anxiety (11 Jun 2019 10:47)  morphine: 2 milligram(s) intravenous every 2 hours, As Needed SOB (11 Jun 2019 10:47)  piperacillin-tazobactam 2 g-0.25 g intravenous injection: 3.375 gram(s) intravenous every 12 hours for 6 more days till 6/16 (11 Jun 2019 11:30)      =========================================================================================    HOSPITAL MEDS.    MEDICATIONS  (STANDING):  dextrose 5% + sodium chloride 0.45% with potassium chloride 20 mEq/L 1000 milliLiter(s) (100 mL/Hr) IV Continuous <Continuous>  heparin  Injectable 5000 Unit(s) SubCutaneous every 8 hours    MEDICATIONS  (PRN):

## 2019-07-01 NOTE — PROGRESS NOTE ADULT - ASSESSMENT
89 y/o female with PMH of CVA, HTN, hypothyroidism was brought to the ED for altered mental status.     Acute encephalopathy likely 2/2 symptomatic hypernatremia  CT head: no acute intracranial finding  suspect sec to poor oral intake, hypovolemic hypernatremia, Na 162 on arrival  sp brief course ivf w/ improvement of Na, not yet normalized  case discussed w/ son/hcp, patient was brought in from home hospice, will resume comfort measures, stop trending Na levels, and instead revert to symptom management  morphine iv + ativan SL prn pain/anxiety/agitation respectively  dulcolax prn constipation  scopolamine prn excess oral secretions    NSTEMI. trop trending down, not normalized yet  Troponin: 0.09 -> 0.08  No ECG changes concerning for acute ischemia  resuming comfort inpatient w/ transition back to home hospice as above, symptom management only    ? New Onset AFib.  no documented hx on review of prior docs, pt w/ hx of cva however poss related  confirmed on ekg  no further w/u as pt on hospice care    Hypokalemia   Supplemented, pending repeat  will no longer do serial bmp    Abnormal CXR  no s/s aside from acute metabolic encephalopathy that would be consistent w/ recurrent infection, mental status change likely due to above, xray w/ continued infiltrate likely residual finding from very recently treated multilobar pna for which she was admitted 6/7-6/11 rather than a new or persistent infection, sp Levaquin 500mg x 2 more days again inpatient, no further need for abx care    Supportive   DVT prophylaxis: none, hospice patient  Diet: slp elan appreciated, remains npo, diet advanced to regular w/ assist for pleasure feeds + drinking given hospice care    Goals of Care discussed at length with the patient's son/hcp via telephone today. This conversation included current condition, prognosis, and options for therapy. Discussed desires by patient for further care and wishes were expressed - comfort measures desired. he expressed that he only called ems to bring patient to the hospital because she was showing signs of distress and discomfort and although he contacted hospice nurse he did not feel comfortable w/ a wait time of > 10min for them to arrive and assess patient. explained again at length that she remains hospice appropriate, and that active management of short term electrolyte abn will not change her overall morbidity/mortality and that they are likely to recur right after dischage given her continued supremely suppressed appetite and toleration of orals. he expressed understanding, reconfirmed that patient is both dnr/dni, gave permission to reinstate comfort measures. discussed w/ him plan to recall hospice and have them speak with him so that he can be comfortable with her returning to the home setting with services. he requested either in hospital hospice or transfer back to hospice [facility], explained that unfortunately she does not currently appear to meet criteria for either but that we will cont to discuss options as time passes. Length of Conversation ~35min.    dispo. pending transition back to home hospice, likely within 24-48hrs    [facility] 89 y/[facility] female with PMH of CVA, HTN, hypothyroidism was brought to the ED for altered mental status.     Acute encephalopathy likely 2/2 symptomatic hypernatremia.  improved  CT head: no acute intracranial finding  suspect sec to poor oral intake, hypovolemic hypernatremia, Na 162 on arrival  sp brief course ivf w/ improvement of Na, not yet normalized  case discussed w/ son/hcp, patient was brought in from home hospice, will resume comfort measures, stop trending Na levels, stop fluids, and instead revert to symptom management  morphine iv + ativan SL prn pain/anxiety/agitation respectively  dulcolax prn constipation  scopolamine prn excess oral secretions    NSTEMI. trop trending down, not normalized yet  Troponin: 0.09 -> 0.08  No ECG changes concerning for acute ischemia  resuming comfort inpatient w/ transition back to home hospice as above, symptom management only    ? New Onset AFib.  no documented hx on review of prior docs, pt w/ hx of cva however poss related  confirmed on ekg  no further w/u as pt on hospice care    Hypokalemia   Supplemented  will no longer do serial bmp to folow    Hypophosphatemia, severe  no symptoms clinically  iv supplementation, no repeat as pt on hospice    Abnormal CXR  no s/s aside from acute metabolic encephalopathy that would be consistent w/ recurrent infection, mental status change likely due to above, xray w/ continued infiltrate likely residual finding from very recently treated multilobar pna for which she was admitted 6/7-6/11 rather than a new or persistent infection, sp Levaquin 500mg x 2 more days again inpatient, no further need for abx care    Supportive   DVT prophylaxis: none, hospice patient  Diet: slp elan appreciated, remains npo, diet advanced to regular w/ assist for pleasure feeds + drinking given hospice care    Goals of Care discussed at length with the patient's son/hcp via telephone today. This conversation included current condition, prognosis, and options for therapy. Discussed desires by patient for further care and wishes were expressed - comfort measures desired. he expressed that he only called ems to bring patient to the hospital because she was showing signs of distress and discomfort and although he contacted hospice nurse he did not feel comfortable w/ a wait time of > 10min for them to arrive and assess patient. explained again at length that she remains hospice appropriate, and that active management of short term electrolyte abn will not change her overall morbidity/mortality and that they are likely to recur right after dischage given her continued supremely suppressed appetite and toleration of orals. he expressed understanding, reconfirmed that patient is both dnr/dni, gave permission to reinstate comfort measures. discussed w/ him plan to recall hospice and have them speak with him so that he can be comfortable with her returning to the home setting with services. he requested either in hospital hospice or transfer back to hospice [facility], explained that unfortunately she does not currently appear to meet criteria for either but that we will cont to discuss options as time passes. Length of Conversation ~35min.    dispo. pending transition back to home hospice, likely within 24-48hrs    [facility]

## 2019-07-01 NOTE — SWALLOW BEDSIDE ASSESSMENT ADULT - ORAL PREPARATORY PHASE
Reduced oral grading/reduced attention to bolus, absent bolus manipulation and attempt to propel bolus posteriorly, bolus remained on lingual blade and was removed by this clinician

## 2019-07-02 NOTE — CHART NOTE - NSCHARTNOTEFT_GEN_A_CORE
received phone call from nursing staff regarding reservations by son/hcp regarding admin of morphine.    called son. In depth phone conversation regarding patient's current clinical condition, diagnosis, therapy, further options for care, and plan moving forward held today due to need to clarify med ordered. he expressed his concerns regarding the reputation of morphine as being a medication used to expedite death. reassured him at length regarding the limited options given patient inability to take oral meds to control pain, he confirmed that his mother would not be able to take tablets as well. discussed that the medication would be held for sedation and would not be given if patient appeared to be sedated or with resp suppression. discussed that the overall goal remains to keep his mother comfortable and not to expedite her mortality . All questions answered at length to the satisfaction of all participants.

## 2019-07-02 NOTE — DISCHARGE NOTE PROVIDER - NSDCFUADDAPPT_GEN_ALL_CORE_FT
-Kindly follow up with hospice if you need anything further. They have on call Nurses and Physicians trained to assist you with all your needs and available at all times to you.

## 2019-07-02 NOTE — GOALS OF CARE CONVERSATION - PERSONAL ADVANCE DIRECTIVE - CONVERSATION DETAILS
Multiple  calls and txt at his request made to the son to discuss plan . awaiting call back .  Case discussed with Dr Alida Sims  at the hospice [facility]  they would consider taking patient  prior  to help  transition  patient back home
Spoke to son Reggie  he has some concerns  he is agreeable to meet with hospice and attending dr adams at 12 noon  7/2 discussed with md hospice will follow
pt appears more symptomatic today with increased  secretions  appears to possible have  pain pt unable to verbalize painad  4 discussed with dr Morse . Discussed with son Reggie as well  he is agreeable to medicate pt he does not wish for pt to suffer  he states he understands pt's time is " limited " he wants pt comfortable . Offered [facility]  transfer at present son does not feel pt can tolerate transfer to the inn  and wishes for her to remain at [facility] for her care . Hospice will follow -

## 2019-07-02 NOTE — DISCHARGE NOTE PROVIDER - HOSPITAL COURSE
89 y/o female with PMH of CVA, HTN, hypothyroidism was brought to the ED for altered mental status.         Acute encephalopathy likely 2/2 symptomatic hypernatremia.  resolved    CT head: no acute intracranial finding    suspect sec to poor oral intake, hypovolemic hypernatremia, Na 162 on arrival    sp brief course ivf w/ improvement of Na, not fully normalized but pt placed on comfort measures w/ no further plans for trending values, confirmed w/ son/hcp    symptom management    morphine iv + ativan SL prn pain/anxiety/agitation respectively    dulcolax prn constipation    scopolamine prn excess oral secretions        NSTEMI. trop trended down, not normalized    Troponin: 0.09 -> 0.08    No ECG changes concerning for acute ischemia    resumed comfort measures    symptom management only        ? New Onset AFib.    no documented hx on review of prior docs, pt w/ hx of cva however poss related    confirmed on ekg    no further w/u as pt on comfort/hospice care, rate controlled on its own    no tele        Hypokalemia     Supplemented, no repeat bmp as pt on comfort, unconfirmed if fully resolved        Hypophosphatemia, severe    no symptoms clinically    sp aggressive iv supplementation, no repeat to confirm resolution as pt on hospice        Abnormal CXR    no s/s aside from acute metabolic encephalopathy that would be consistent w/ recurrent infection, mental status change likely due to above, xray w/ continued infiltrate likely residual finding from very recently treated multilobar pna for which she was admitted 6/7-6/11 rather than a new or persistent infection, sp Levaquin 500mg x 2 doses since admission, no further need for abx care    asymptomatic        Supportive     Diet: slp elan appreciated, remains npo, diet regular w/ assist for pleasure feeds + drinking given comfort measures + family request        dispo. transition back to hospice        PHYSICAL EXAM.        GEN - appears age appropriate. frail. pleasant. no distress.     HEENT - NCAT, EOMI, CATHI. BL cataracts    RESP - on supplemental O2. unable to speak in full sentences     Neuro - Awake, alert, difficult to assess orientation as pt w/ limited verbal responses. no clear gross signs of sensory or motor deficits    Psych - normal affect        All electrolyte abnormalities were monitored carefully and repleted as necessary during this hospitalization prior to transition to comfort measures. At the time of discharge patient was hemodynamically stable although w/ very poor prognosis w/ plans to transition back to hospice care.         Length of Discharge: 45MIN

## 2019-07-02 NOTE — PROGRESS NOTE ADULT - ASSESSMENT
89 y/o female with PMH of CVA, HTN, hypothyroidism was brought to the ED for altered mental status.     Acute encephalopathy likely 2/2 symptomatic hypernatremia.  improved  CT head: no acute intracranial finding  suspect sec to poor oral intake, hypovolemic hypernatremia, Na 162 on arrival  sp brief course ivf w/ improvement of Na, not fully normalized but pt placed on comfort measures w/ no further plans for trending values, confirmed w/ son/hcp  continue symptom management  morphine iv + ativan SL prn pain/anxiety/agitation respectively  dulcolax prn constipation  scopolamine prn excess oral secretions    NSTEMI. trop trended down, not normalized  Troponin: 0.09 -> 0.08  No ECG changes concerning for acute ischemia  resumed comfort measurs  symptom management only    ? New Onset AFib.  no documented hx on review of prior docs, pt w/ hx of cva however poss related  confirmed on ekg  no further w/u as pt on comfort/hospice care  no tele    Hypokalemia   Supplemented, no repeat bmp as pt on comfort, unconfirmed if fully resolved    Hypophosphatemia, severe  no symptoms clinically  sp aggressive iv supplementation, no repeat to confirm resolution as pt on hospice    Abnormal CXR  no s/s aside from acute metabolic encephalopathy that would be consistent w/ recurrent infection, mental status change likely due to above, xray w/ continued infiltrate likely residual finding from very recently treated multilobar pna for which she was admitted 6/7-6/11 rather than a new or persistent infection, sp Levaquin 500mg x 2 doses since admission, no further need for abx care  asymptomatic    Supportive   DVT prophylaxis: none, hospice patient  Diet: slp elan rain, remains npo, diet regular w/ assist for pleasure feeds + drinking given comfort measures + family request    sp Goals of Care discussion at length with the patient's son/hcp, pending family meeting w/ hcp + hospice to discuss transitioning back to home hospice, [facility]    dispo. pending transition back to home hospice, likely within 24-48hrs    [facility] 89 [facility] female with PMH of CVA, HTN, hypothyroidism was brought to the ED for altered mental status.     Acute encephalopathy likely 2/2 symptomatic hypernatremia.  resolved  CT head: no acute intracranial finding  suspect sec to poor oral intake, hypovolemic hypernatremia, Na 162 on arrival  sp brief course ivf w/ improvement of Na, not fully normalized but pt placed on comfort measures w/ no further plans for trending values, confirmed w/ son/hcp  continue symptom management  morphine iv + ativan SL prn pain/anxiety/agitation respectively  dulcolax prn constipation  scopolamine prn excess oral secretions    NSTEMI. trop trended down, not normalized  Troponin: 0.09 -> 0.08  No ECG changes concerning for acute ischemia  resumed comfort measurs  symptom management only    ? New Onset AFib.  no documented hx on review of prior docs, pt w/ hx of cva however poss related  confirmed on ekg  no further w/u as pt on comfort/hospice care  no tele    Hypokalemia   Supplemented, no repeat bmp as pt on comfort, unconfirmed if fully resolved    Hypophosphatemia, severe  no symptoms clinically  sp aggressive iv supplementation, no repeat to confirm resolution as pt on hospice    Abnormal CXR  no s/s aside from acute metabolic encephalopathy that would be consistent w/ recurrent infection, mental status change likely due to above, xray w/ continued infiltrate likely residual finding from very recently treated multilobar pna for which she was admitted 6/7-6/11 rather than a new or persistent infection, sp Levaquin 500mg x 2 doses since admission, no further need for abx care  asymptomatic    Supportive   DVT prophylaxis: none, hospice patient  Diet: slp elan appreciated, remains npo, diet regular w/ assist for pleasure feeds + drinking given comfort measures + family request    Goals of Care discussed at length again with the patient's son/hcp. This conversation again included reiteration of current condition, prognosis, and options for therapy. Discussed desires by patient for further care and wishes were expressed - comfort measures confirmed. Advanced directives discussed, confirmed. pt son noting mult. ep of pain by patient, agreed with him that due to inability to communicate effectively, pt will be likely difficult to assess pain in which will likely cause her to miss on necessary medications. agreed with low dose around the clock pain meds w/ hold for sedation to optimize her comfort. will also make robinol standing given worsening secretions w/ holds after secretions have cleared up. given continued minimal intake, discussed that prognosis for pt continues to worsen daily and that depending on when dispo can be confirmed that the patient may unfortunately pass away here in hospital, he expressed understanding and knows that this is the natural course that her situation may take. conversation held at bedside. Length of Conversation ~35min.    dispo. pending transition back to home hospice, likely within 24-48hrs if that can be set up, will follow closely however given poor prognosis pt may now be approaching inpatient hospice appropriate level    [facility]pt [facility]

## 2019-07-02 NOTE — PROGRESS NOTE ADULT - SUBJECTIVE AND OBJECTIVE BOX
CC: ams    Patient seen and examined at the bedside. No acute overnight events. resumed comfort care inpatient yesterday. Complaining of - today however pt w/ very limited verbal responses, unable to assess full ROS at length.      =========================================================================================    PHYSICAL EXAM.    GEN - appears age appropriate. frail. pleasant. no distress.   HEENT - NCAT, EOMI, CATHI. BL cataracts  RESP - on supplemental O2. unable to speak in full sentences   Neuro - Awake, alert, difficult to assess orientation as pt w/ limited verbal responses. no clear gross signs of sensory or motor deficits  Psych - normal affect      VITAL SIGNS.    Vital Signs Last 24 Hrs  T(C): --  T(F): --  HR: --  BP: --  BP(mean): --  RR: --  SpO2: --        =================================================    LABS.                          10.4   13.36 )-----------( 217      ( 01 Jul 2019 09:03 )             33.8     06-30    157<H>  |  114<H>  |  17.0  ----------------------------<  136<H>  2.7<LL>   |  31.0<H>  |  0.91    Ca    7.6<L>      30 Jun 2019 14:13  Phos  0.8     07-01  Mg     1.7     07-01          I&O's Summary        ================================================    IMAGING.      ================================================    HOME MEDS.    Home Medications:  acetaminophen 325 mg rectal suppository: 1 suppository(ies) rectal every 6 hours, As needed, Mild Pain (1 - 3) (01 Jul 2019 12:22)  bisacodyl 10 mg rectal suppository: 1 suppository(ies) rectal once a day, As needed, Constipation (01 Jul 2019 12:22)  scopolamine 1.5 mg transdermal film, extended release: 1 patch transdermal every 3 days, As Needed excessive oral secretions (01 Jul 2019 12:22)      ================================================    HOSPITAL MEDS.    MEDICATIONS  (STANDING):    MEDICATIONS  (PRN):  acetaminophen  Suppository .. 325 milliGRAM(s) Rectal every 6 hours PRN Mild Pain (1 - 3)  bisacodyl Suppository 10 milliGRAM(s) Rectal daily PRN Constipation  LORazepam    Concentrate 0.25 milliGRAM(s) Oral every 4 hours PRN anxiety/agitation  morphine  - Injectable 2 milliGRAM(s) IV Push every 4 hours PRN Severe Pain (7 - 10)/respiratory distress  scopolamine   Patch 1 Patch Transdermal every 72 hours PRN excess oral secretions CC: ams    Patient seen and examined at the bedside. No acute overnight events. resumed comfort care inpatient yesterday. Complaining of nothing today however pt w/ very limited verbal responses, unable to assess full ROS at length.      =========================================================================================    PHYSICAL EXAM.    GEN - appears age appropriate. frail. pleasant. no distress.   HEENT - NCAT, EOMI, CATHI. BL cataracts  RESP - on supplemental O2. unable to speak in full sentences. noted audible gurgling, likely upper airway congestion from excess secretions  Neuro - Awake, alert, difficult to assess orientation as pt w/ limited verbal responses. no clear gross signs of sensory or motor deficits  Psych - normal affect      VITAL SIGNS.    Vital Signs Last 24 Hrs  T(C): --  T(F): --  HR: --  BP: --  BP(mean): --  RR: --  SpO2: --        =================================================    LABS.                          10.4   13.36 )-----------( 217      ( 01 Jul 2019 09:03 )             33.8     06-30    157<H>  |  114<H>  |  17.0  ----------------------------<  136<H>  2.7<LL>   |  31.0<H>  |  0.91    Ca    7.6<L>      30 Jun 2019 14:13  Phos  0.8     07-01  Mg     1.7     07-01          I&O's Summary        ================================================    IMAGING.      ================================================    HOME MEDS.    Home Medications:  acetaminophen 325 mg rectal suppository: 1 suppository(ies) rectal every 6 hours, As needed, Mild Pain (1 - 3) (01 Jul 2019 12:22)  bisacodyl 10 mg rectal suppository: 1 suppository(ies) rectal once a day, As needed, Constipation (01 Jul 2019 12:22)  scopolamine 1.5 mg transdermal film, extended release: 1 patch transdermal every 3 days, As Needed excessive oral secretions (01 Jul 2019 12:22)      ================================================    HOSPITAL MEDS.    MEDICATIONS  (STANDING):    MEDICATIONS  (PRN):  acetaminophen  Suppository .. 325 milliGRAM(s) Rectal every 6 hours PRN Mild Pain (1 - 3)  bisacodyl Suppository 10 milliGRAM(s) Rectal daily PRN Constipation  LORazepam    Concentrate 0.25 milliGRAM(s) Oral every 4 hours PRN anxiety/agitation  morphine  - Injectable 2 milliGRAM(s) IV Push every 4 hours PRN Severe Pain (7 - 10)/respiratory distress  scopolamine   Patch 1 Patch Transdermal every 72 hours PRN excess oral secretions

## 2019-07-02 NOTE — DISCHARGE NOTE PROVIDER - NSDCCPCAREPLAN_GEN_ALL_CORE_FT
PRINCIPAL DISCHARGE DIAGNOSIS  Diagnosis: Hypernatremia  Assessment and Plan of Treatment: You were treated for this condition in the hospital, it is very likely that your inability to sustain a full diet with a large amount of drinking water is the likely cause behind why your sodium level prosper.  You are now ready to leave the hospital and continue your journey. It is no longer necessary to continue taking medications for your other medical problems, if any. You should focus on taking medications that make you or keep you comfortable. From our hearts to yours we wish you all the best, and thank you for giving us the privilege of caring for you during your time here at [facility].      SECONDARY DISCHARGE DIAGNOSES  Diagnosis: Altered mental status  Assessment and Plan of Treatment: You were treated for this condition and at this time it is considered resolved. You may follow up with your doctors as an outpatient.

## 2019-07-02 NOTE — DISCHARGE NOTE PROVIDER - NSDCFUADDINST_GEN_ALL_CORE_FT
XRAY CHEST:  FINDINGS:     HEART: normalin size   LUNGS: Improved aeration in the left upper lobe. Left basilar and right   parahilar infiltrates.    OSSEOUS STRUCTURES:: degenerative changes    Cholecystectomy clips.    IMPRESSION:   Left basilar and right parahilar infiltrates    CT HEAD:  No acute intracranial hemorrhage or mass effect. Extensive chronic   ischemic changes, as described. No interval change compared with prior   exam    < end of copied text >

## 2019-07-03 NOTE — PROGRESS NOTE ADULT - ASSESSMENT
89 y/o female with PMH of CVA, HTN, hypothyroidism was brought to the ED for altered mental status.     Acute encephalopathy likely 2/2 symptomatic hypernatremia.  resolved  CT head: no acute intracranial finding  suspect sec to poor oral intake, hypovolemic hypernatremia, Na 162 on arrival  sp brief course ivf w/ improvement of Na, not fully normalized but pt placed on comfort measures w/ no further plans for trending values, confirmed w/ son/hcp  continue symptom management  morphine iv, inc frequency for comfort, facial spasms likely sec to recurrent of electrolyte abn  ativan SL prn pain/anxiety/agitation respectively  dulcolax prn constipation  scopolamine prn excess oral secretions    NSTEMI. trop trended down, not normalized  Troponin: 0.09 -> 0.08  No ECG changes concerning for acute ischemia  resumed comfort measurses  symptom management only    ? New Onset AFib.  no documented hx on review of prior docs, pt w/ hx of cva however poss related  confirmed on ekg  no further w/u as pt on comfort/hospice care  no tele    Hypokalemia   Supplemented, no repeat bmp as pt on comfort, unconfirmed if fully resolved    Hypophosphatemia, severe  no symptoms clinically  sp aggressive iv supplementation, no repeat to confirm resolution as pt on hospice    Abnormal CXR  no s/s aside from acute metabolic encephalopathy that would be consistent w/ recurrent infection, mental status change likely due to above, xray w/ continued infiltrate likely residual finding from very recently treated multilobar pna for which she was admitted 6/7-6/11 rather than a new or persistent infection, sp Levaquin 500mg x 2 doses since admission, no further need for abx care  asymptomatic  explained to son again on 7/3 regarding reasoning behind why abx have not resumed and current plan of case as pt is on comfort measures, he expressed understanding and is in agreement    Supportive   DVT prophylaxis: none, hospice patient  Diet: slp elan appreciated, remains npo, diet regular w/ assist for pleasure feeds + drinking given comfort measures + family request.    dispo. pt w/ worsening condition currently experiencing likely symptoms of uncontrolled electrolytes in setting of minimal oral intake. cont pain control, extremely poor prognosis as expected, likely will pass away in coming days, will keep inpatient and management symptoms aggressively to ensure comfort, son agreeable to plan

## 2019-07-04 NOTE — DISCHARGE NOTE FOR THE EXPIRED PATIENT - HOSPITAL COURSE
89 y/o female with PMH of CVA, HTN, hypothyroidism was brought to the ED for altered mental status.     Acute encephalopathy likely 2/2 symptomatic hypernatremia.  resolved  CT head: no acute intracranial finding  suspect sec to poor oral intake, hypovolemic hypernatremia, Na 162 on arrival  sp brief course ivf w/ improvement of Na, not fully normalized but pt placed on comfort measures w/ no further plans for trending values, confirmed w/ son/hcp  symptom management  morphine iv + ativan SL prn pain/anxiety/agitation respectively  dulcolax prn constipation  scopolamine prn excess oral secretions    NSTEMI. trop trended down, not normalized  Troponin: 0.09 -> 0.08  No ECG changes concerning for acute ischemia  resumed comfort measures  symptom management only  no further w/u as pt on comfort/hospice care, rate controlled on its own  dispo. transition back to hospice  Patient finally passed away at 2300hrs when her respiration ceased and her heart stopped beating.  Patient pronounced and Son Mr Hurtado notified of death at # [phone]

## 2019-07-04 NOTE — PROGRESS NOTE ADULT - SUBJECTIVE AND OBJECTIVE BOX
CC: ams    Interval Events  no acute events  patient with normal respirations but congested sounding with increased oral secretions  no notable scopolamine patch behind either ear  will have nurse check more thoroughly    PHYSICAL EXAM.  GEN - appears age appropriate. frail. pleasant. no distress.  HEENT - NCAT, EOMI, CATHI. BL cataracts. noted to have spasms of bL face L>R  RESP - unable to speak in full sentences. noted audible gurgling, likely upper airway congestion from excess secretions  Neuro - easily arousable, remains alert for exam, absent verbal response/speech  Psych - flat affect    MEDICATIONS  (STANDING):  glycopyrrolate Injectable 0.4 milliGRAM(s) IV Push every 8 hours  morphine  - Injectable 2 milliGRAM(s) IV Push every 4 hours    MEDICATIONS  (PRN):  acetaminophen  Suppository .. 325 milliGRAM(s) Rectal every 6 hours PRN Mild Pain (1 - 3)  bisacodyl Suppository 10 milliGRAM(s) Rectal daily PRN Constipation  LORazepam    Concentrate 0.25 milliGRAM(s) Oral every 4 hours PRN anxiety/agitation  scopolamine   Patch 1 Patch Transdermal every 72 hours PRN excess oral secretions

## 2019-07-04 NOTE — DIETITIAN INITIAL EVALUATION ADULT. - OTHER INFO
2 y/o female with PMH of CVA, HTN, hypothyroidism was brought to the ED for altered mental status.   plans for comfort measures and symptom management. As per MD note, Pt w/ worsening condition currently experiencing likely symptoms of uncontrolled electrolytes in setting of minimal oral intake. cont pain control, extremely poor prognosis as expected, likely will pass away in coming days, will keep inpatient and management symptoms aggressively to ensure comfort, son agreeable to plan. No height/weight available in EMR, Spoke with nursing. Pt does not take any po diet. No physical performed due to comfort care only. Pt with stage 2 left leg noted by nursing. No family available to speak with.

## 2019-07-04 NOTE — PROGRESS NOTE ADULT - ASSESSMENT
89 y/o female with PMH of CVA, HTN, hypothyroidism was brought to the ED for altered mental status.     Acute encephalopathy likely 2/2 symptomatic hypernatremia and previous underlying strokes; expressive aphasia at baseline  plans for comfort measures and symptom management  moprhine IV 2mg q4 hours PRN  ativan SL prn pain/anxiety/agitation respectively  dulcolax prn constipation  scopolamine prn excess oral secretions q 72 hours    NSTEMI. trop trended down, not normalized  symptom management only    ? New Onset AFib.  currently not on tele  symptom management only as above    Hypokalemia   supplemented on admission    Hypophosphatemia, severe  sp aggressive iv supplementation    Persistent CXR findings; consistent with resolving pneumonia from previous admission - no longer on levaquin  symptom management only as above    DISPO: pt w/ worsening condition currently experiencing likely symptoms of uncontrolled electrolytes in setting of minimal oral intake. cont pain control, extremely poor prognosis as expected, likely will pass away in coming days, will keep inpatient and management symptoms aggressively to ensure comfort, son agreeable to plan

## 2019-07-04 NOTE — DISCHARGE NOTE FOR THE EXPIRED PATIENT - NS EXPIRED FAMCONTACTED
I called # in chart (Son) repeatedly. NO answer.. [phone] I called # in chart (Son) repeatedly. NO answer.. [phone]/yes

## 2019-07-30 NOTE — DIETITIAN INITIAL EVALUATION ADULT. - NS AS NUTRI DX NUTRIENT
PROCEDURES:  Tonsillectomy and adenoidectomy, age younger than 12 30-Jul-2019 08:15:25  Lizzie Acosta
Malnutrition/severe, chronic

## 2020-08-31 NOTE — PATIENT PROFILE ADULT - NSASFUNCLEVELADLAMBULATE_GEN_A_NUR
8/31/20:. confirmed today as L homo criss.  see #2 below to explain visual symptoms.  see PCP to consider further work up from here. 4 = completely dependent

## 2020-10-08 NOTE — PATIENT PROFILE ADULT - FALL HARM RISK CONCLUSION
Bedside report from DENICE Lanier. Pt resting comfortably on [facility] in NAD. Mother at bedside in recliner. robert Wang, present for observation. Behavioral Health Room Safety Checklist reviewed and in use.    Fall with Harm Risk

## 2021-08-21 NOTE — ED CLERICAL - DIVISION
[address] [facility] ED  eMERGENCY dEPARTMENT eNCOUnter      Pt Name: Austin Slade  MRN: [id number]  [place] 1977  Date of evaluation: 8/21/2021  Provider: Tracie Thorne PA-C    CHIEF COMPLAINT       Chief Complaint   Patient presents with    Wound Check     Right eye          HISTORY OF PRESENT ILLNESS   (Location/Symptom, Timing/Onset,Context/Setting, Quality, Duration, Modifying Factors, Severity)  Note limiting factors. Austin Slade is a 40 y.o. female who presents to the emergency department with a complaint of right-sided facial injury near her eye. Patient states she was trimming tree branches the other yesterday, she states one of the branches poked her to the lateral aspect of her right eye, she has increased pain when touched, or laying on that side of her face, no visual changes. Patient rates her pain as an 8 out of 10. Past medical history significant for asthma    HPI    NursingNotes were reviewed. REVIEW OF SYSTEMS    (2-9 systems for level 4, 10 or more for level 5)     Review of Systems   Constitutional: Negative for activity change and appetite change. HENT: Negative for congestion, ear discharge, ear pain, nosebleeds, rhinorrhea and sore throat. Eyes: Negative for discharge. Respiratory: Negative for shortness of breath. Cardiovascular: Negative for chest pain, palpitations and leg swelling. Gastrointestinal: Negative for abdominal distention, abdominal pain and constipation. Genitourinary: Negative for difficulty urinating and dysuria. Musculoskeletal: Negative for arthralgias. Skin: Positive for wound. Negative for color change. Puncture wound to lateral aspect of right eye   Neurological: Negative for dizziness, syncope, numbness and headaches. Psychiatric/Behavioral: Negative for agitation and confusion. Except as noted above the remainder of the review of systems was reviewed and negative.        PAST MEDICAL HISTORY     Past [place]... Medical History:   Diagnosis Date    Asthma     History of blood transfusion 2004    s/p oophorectomy     Ovarian cyst     right     Pneumonia          SURGICALHISTORY       Past Surgical History:   Procedure Laterality Date     SECTION      x2    CHOLECYSTECTOMY  2004    HYSTERECTOMY VAGINAL N/A 2017    [facility] BILATERAL SALPINGECTOMY performed by Jordyn Steen MD at [address]      rt ovary         CURRENT MEDICATIONS       Discharge Medication List as of 2021  8:24 AM      CONTINUE these medications which have NOT CHANGED    Details   triamterene-hydroCHLOROthiazide (MAXZIDE-25) 37.5-25 MG per tablet Take 1 tablet by mouth daily, Disp-30 tablet, R-5Normal      albuterol sulfate HFA (PROAIR HFA) 108 (90 Base) MCG/ACT inhaler 2 puffs; Use every 4 hours while awake for 7-10 days then PRN wheezing  Dispense with SPACER and Instruct on use.   May sub Ventolin or Proventil as needed per Insurance., Disp-1 Inhaler, R-1Print             ALLERGIES     Ciprofloxacin, Amoxicillin, Tylenol [acetaminophen], Aspirin, and Ibuprofen    FAMILY HISTORY       Family History   Problem Relation Age of Onset    Diabetes Mother     No Known Problems Sister     No Known Problems Brother     Asthma Daughter     Premature Birth Daughter         twins    No Known Problems Son           SOCIAL HISTORY       Social History     Socioeconomic History    Marital status: Single     Spouse name: None    Number of children: None    Years of education: None    Highest education level: None   Occupational History    None   Tobacco Use    Smoking status: Current Some Day Smoker     Packs/day: 0.20     Years: 10.00     Pack years: 2.00     Types: Cigarettes    Smokeless tobacco: Never Used   Vaping Use    Vaping Use: Never used   Substance and Sexual Activity    Alcohol use: Yes     Comment: socially    Drug use: Not Currently     Types: Marijuana    Sexual activity: Not Currently   Other Topics Concern    None   Social History Narrative    None     Social Determinants of Health     Financial Resource Strain:     Difficulty of Paying Living Expenses:    Food Insecurity:     Worried About Running Out of Food in the Last Year:     [address] in the Last Year:    Transportation Needs:     Lack of Transportation (Medical):  Lack of Transportation (Non-Medical):    Physical Activity:     Days of Exercise per Week:     Minutes of Exercise per Session:    Stress:     Feeling of Stress :    Social Connections:     Frequency of Communication with Friends and Family:     Frequency of Social Gatherings with Friends and Family:     Attends Yazidism Services:     Active Member of Clubs or Organizations:     Attends Club or Organization Meetings:     Marital Status:    Intimate Partner Violence:     Fear of Current or Ex-Partner:     Emotionally Abused:     Physically Abused:     Sexually Abused:        SCREENINGS      @FLOW([id number])@      PHYSICAL EXAM    (up to 7 for level 4, 8 or more for level 5)     ED Triage Vitals [08/21/21 0811]   BP Temp Temp Source Pulse Resp SpO2 Height Weight   (!) 142/92 98.3 °F (36.8 °C) Oral 79 18 96 % 5' 11\" (1.803 m) 230 lb (104.3 kg)       Physical Exam  Vitals and nursing note reviewed. Constitutional:       General: She is not in acute distress. Appearance: She is well-developed. She is not ill-appearing, toxic-appearing or diaphoretic. HENT:      Head: Normocephalic. Nose: No congestion. Mouth/Throat:      Mouth: Mucous membranes are moist.      Pharynx: No oropharyngeal exudate or posterior oropharyngeal erythema. Eyes:      Extraocular Movements: Extraocular movements intact. Conjunctiva/sclera: Conjunctivae normal.      Pupils: Pupils are equal, round, and reactive to light. Comments: Small puncture wound noted to lateral aspect of face by canthus, of right eye.   This does not involve the eye, there is no ocular injury, there is no scleral injection. There is no visual changes, there is no pain with movement. Pupils are equal round reactive to light, equal ocular movement without any increased pain or restriction. Neck:      Vascular: No JVD. Trachea: No tracheal deviation. Cardiovascular:      Rate and Rhythm: Normal rate. Pulses: Normal pulses. Heart sounds: Normal heart sounds. No murmur heard. No friction rub. No gallop. Pulmonary:      Effort: Pulmonary effort is normal. No tachypnea, accessory muscle usage, respiratory distress or retractions. Breath sounds: No stridor. No wheezing, rhonchi or rales. Chest:      Chest wall: No tenderness. Abdominal:      General: Abdomen is flat. Bowel sounds are normal. There is no distension or abdominal bruit. Palpations: There is no shifting dullness, fluid wave, hepatomegaly, splenomegaly, mass or pulsatile mass. Tenderness: There is no abdominal tenderness. There is no right CVA tenderness, left CVA tenderness, guarding or rebound. Negative signs include Ramirez's sign, Rovsing's sign and McBurney's sign. Musculoskeletal:         General: No deformity. Cervical back: Normal range of motion and neck supple. No rigidity. Skin:     General: Skin is warm and dry. Capillary Refill: Capillary refill takes less than 2 seconds. Coloration: Skin is not jaundiced. Neurological:      General: No focal deficit present. Mental Status: She is alert and oriented to person, place, and time. Mental status is at baseline. Cranial Nerves: No cranial nerve deficit. Sensory: No sensory deficit. Motor: No weakness.       Coordination: Coordination normal.   Psychiatric:         Mood and Affect: Mood normal.         DIAGNOSTIC RESULTS     EKG: All EKG's are interpreted by the Emergency Department Physician who either signs or Co-signsthis chart in the absence of a cardiologist.        RADIOLOGY:   Para [place] such as CT, Ultrasound and MRI are read by the radiologist. Plain radiographic images are visualized and preliminarily interpreted by the emergency physician with the below findings:        Interpretation per the Radiologist below, if available at the time ofthis note:    No orders to display         ED BEDSIDE ULTRASOUND:   Performed by ED Physician - none    LABS:  Labs Reviewed - No data to display    All other labs were within normal range or not returned as of this dictation. EMERGENCY DEPARTMENT COURSE and DIFFERENTIAL DIAGNOSIS/MDM:   Vitals:    Vitals:    08/21/21 0811   BP: (!) 142/92   Pulse: 79   Resp: 18   Temp: 98.3 °F (36.8 °C)   TempSrc: Oral   SpO2: 96%   Weight: 230 lb (104.3 kg)   Height: 5' 11\" (1.803 m)        MDM  Number of Diagnoses or Management Options  Facial contusion, initial encounter  Puncture wound of face, initial encounter  Diagnosis management comments: Patient presents with pain to the area soft tissue lateral to the right eye, she states she was trimming branches in a tree yesterday, she states she got poked to the soft tissue. There is no ocular changes, there is no pain with movement of the eye, no visual disturbances. No bleeding drainage or discharge is noted. The area appears to be healing well. There is no scleral injection. Patient was given a prescription for Bactroban cream.  Advised to follow-up with her eye doctor next 2 to 3 days, she is also given referral to family medicine. She was advised if he has worsening or change symptoms return to the ER. CRITICAL CARE TIME   Total Critical Care time was 0 minutes, excluding separately reportableprocedures. There was a high probability of clinicallysignificant/life threatening deterioration in the patient's condition which required my urgent intervention. CONSULTS:  None    PROCEDURES:  Unless otherwise noted below, none     Procedures    FINAL IMPRESSION      1. Facial contusion, initial encounter    2.  Puncture wound of face, initial encounter          DISPOSITION/PLAN   DISPOSITION Decision To Discharge 08/21/2021 08:16:45 AM      PATIENT REFERRED TO:  Chito Strong MD  [address] [address]  [phone]    In 3 days        DISCHARGE MEDICATIONS:  Discharge Medication List as of 8/21/2021  8:24 AM      START taking these medications    Details   mupirocin (BACTROBAN) 2 % cream Apply topically 3 times daily. , Disp-15 g, R-0, Print                (Please note that portions of this note were completed with a voice recognition program.  Efforts were made to edit the dictations but occasionally words are mis-transcribed.)    Ines Lane PA-C (electronically signed)  Attending Emergency Physician         Ines Lane PA-C  08/21/21 1700 [address] CLEMENTINA Zapata  08/21/21 4715

## 2021-10-08 NOTE — H&P ADULT - NSHPPOADEEPVENOUSTHROMB_GEN_A_CORE
no Electrodesiccation Text: The wound bed was treated with electrodesiccation after the biopsy was performed.

## 2022-02-02 NOTE — ED ADULT NURSE NOTE - CAS EDN DISCHARGE INTERVENTIONS
F/u 1 year
F/u 1 year clinic ep. No further cysto. Patient can see me or np/pa. 
Rtc in 1 year to check symptoms
Arm band on/IV intact

## 2022-08-09 NOTE — PATIENT PROFILE ADULT - NSPRESCRALCFREQ_GEN_A_NUR
History  Chief Complaint   Patient presents with    Sore Throat     Sore throat for past week with pain going towards ears now  Patient presents for evaluation of 2 days of sore throat with tender lymph nodes and odynophagia  She tried to take some leftover amoxicillin from prior surgery without relief  She did not take any anti-inflammatories or analgesics  She had 1 day of fever 3 days ago, T-max 101° F  The pain is radiating toward her ears but she has no discrete ear pain  She denies cough  No recent travel or known sick contacts  Denies LH/dizziness, CP, SOB, n/v/d  Denies other complaints  History provided by:  Patient and medical records  Sore Throat  Location:  Posterior  Quality:  Aching and sharp  Severity:  Moderate  Onset quality:  Gradual  Duration:  1 week  Timing:  Constant  Progression:  Waxing and waning  Chronicity:  New  Relieved by:  None tried  Worsened by:  Drinking, eating and swallowing  Ineffective treatments:  None tried  Associated symptoms: ear pain, fever and trouble swallowing (Due to pain)    Associated symptoms: no abdominal pain, no adenopathy, no chest pain, no chills, no cough, no drooling, no ear discharge, no epistaxis, no eye discharge, no headaches, no neck stiffness, no rash, no rhinorrhea, no shortness of breath, no sinus congestion and no voice change    Risk factors: no exposure to strep, no exposure to mono, no sick contacts, no recent dental procedure and no recent ENT procedure        None       History reviewed  No pertinent past medical history  Past Surgical History:   Procedure Laterality Date    BREAST SURGERY Bilateral        History reviewed  No pertinent family history  I have reviewed and agree with the history as documented      E-Cigarette/Vaping    E-Cigarette Use Never User      E-Cigarette/Vaping Substances     Social History     Tobacco Use    Smoking status: Never Smoker    Smokeless tobacco: Never Used   Vaping Use  Vaping Use: Never used   Substance Use Topics    Alcohol use: Never    Drug use: Never       Review of Systems   Constitutional: Positive for fever  Negative for chills  HENT: Positive for ear pain, sore throat and trouble swallowing (Due to pain)  Negative for congestion, drooling, ear discharge, nosebleeds, rhinorrhea and voice change  Eyes: Negative  Negative for discharge  Respiratory: Negative for cough, chest tightness, shortness of breath and wheezing  Cardiovascular: Negative for chest pain, palpitations and leg swelling  Gastrointestinal: Negative for abdominal pain, diarrhea, nausea and vomiting  Genitourinary: Negative for dysuria, flank pain, frequency and urgency  Musculoskeletal: Negative for back pain, neck pain and neck stiffness  Skin: Negative for pallor and rash  Neurological: Negative for dizziness, syncope, weakness, light-headedness, numbness and headaches  Hematological: Negative for adenopathy  Psychiatric/Behavioral: Negative for confusion  The patient is not nervous/anxious  All other systems reviewed and are negative  Physical Exam  Physical Exam  Vitals reviewed  Constitutional:       General: She is not in acute distress  Appearance: She is well-developed  She is not ill-appearing, toxic-appearing or diaphoretic  HENT:      Head: Normocephalic  Right Ear: Tympanic membrane, ear canal and external ear normal       Left Ear: Tympanic membrane, ear canal and external ear normal       Nose: No congestion or rhinorrhea  Mouth/Throat:      Mouth: Mucous membranes are dry  No oral lesions  Pharynx: Posterior oropharyngeal erythema present  No pharyngeal swelling, oropharyngeal exudate or uvula swelling  Eyes:      General: No scleral icterus  Conjunctiva/sclera: Conjunctivae normal       Pupils: Pupils are equal, round, and reactive to light  Cardiovascular:      Rate and Rhythm: Normal rate and regular rhythm        Heart sounds: No murmur heard  Pulmonary:      Effort: Pulmonary effort is normal       Breath sounds: Normal breath sounds  Abdominal:      General: Bowel sounds are normal  There is no distension  Palpations: Abdomen is soft  Tenderness: There is no abdominal tenderness  Musculoskeletal:         General: No tenderness  Normal range of motion  Cervical back: Normal range of motion and neck supple  Lymphadenopathy:      Cervical: Cervical adenopathy present  Skin:     General: Skin is warm and dry  Capillary Refill: Capillary refill takes less than 2 seconds  Coloration: Skin is not pale  Findings: No erythema or rash  Neurological:      General: No focal deficit present  Mental Status: She is alert and oriented to person, place, and time  Cranial Nerves: No cranial nerve deficit  Motor: No abnormal muscle tone  Psychiatric:         Mood and Affect: Mood normal          Behavior: Behavior normal          Thought Content: Thought content normal          Vital Signs  ED Triage Vitals [08/09/22 1758]   Temperature Pulse Respirations Blood Pressure SpO2   98 1 °F (36 7 °C) 76 16 127/73 98 %      Temp Source Heart Rate Source Patient Position - Orthostatic VS BP Location FiO2 (%)   Oral Monitor Sitting Left arm --      Pain Score       9           Vitals:    08/09/22 1758   BP: 127/73   Pulse: 76   Patient Position - Orthostatic VS: Sitting         Visual Acuity      ED Medications  Medications - No data to display    Diagnostic Studies  Results Reviewed     None                 No orders to display              Procedures  Procedures         ED Course                                             MDM  Number of Diagnoses or Management Options  Viral pharyngitis  Diagnosis management comments: DDx:  Sore throat - viral pharyngitis, less likely strep based on physical exam, doubt retropharyngeal abscess or peritonsillar abscess      A/P: Will treat symptoms, recommend establish and follow-up with PCP  Amount and/or Complexity of Data Reviewed  Review and summarize past medical records: yes        Disposition  Final diagnoses:   Viral pharyngitis     Time reflects when diagnosis was documented in both MDM as applicable and the Disposition within this note     Time User Action Codes Description Comment    8/9/2022  6:13 PM [address]  [address], [address] Kindra Lou [[id number]] Viral pharyngitis       ED Disposition     ED Disposition   Discharge    Condition   Stable    Date/Time   Tue Aug 9, 2022  6:13 PM    Comment   Tamara Pardo discharge to home/self care  Follow-up Information     Follow up With Specialties Details Why Contact Info Additional [facility] Schedule an appointment as soon as possible for a visit in 3 days if symptoms do not improve and to establish a family doctor for follow-up,, preventative and ongoing care  [address], [address] [facility], [address], [address] [place], [place], [address]          Discharge Medication List as of 8/9/2022  6:16 PM      START taking these medications    Details   naproxen (NAPROSYN) 500 mg tablet Take 1 tablet (500 mg total) by mouth 2 (two) times a day with meals for 10 days, Starting Tue 8/9/2022, Until Fri 8/19/2022, Print             No discharge procedures on file      PDMP Review     None          ED Provider  Electronically Signed by           Edward Lopez DO  08/09/22 8548 Never

## 2023-06-27 NOTE — PATIENT PROFILE ADULT - HOW PATIENT ADDRESSED, PROFILE
Chetan Garzon MRN:[id number]  Last office visit: 6/30/22  Follow up: 12 months.   Next appointment: 7/3/23    Refill request for:   chlorthalidone (THALITONE) 25 MG tablet 90 tablet 1 3/6/2023     Sig - Route: Take 1 tablet by mouth daily. - Oral            CRITERIA MET. MEDICATION SIGNED  Diuretics Refill Protocol - 12 Month Protocol Passed 06/27/2023 09:19 AM   Protocol Details  Seen by prescribing provider or same department within the last 12 months or has a future appt in 3 months - IF FAILED PLEASE LOOK AT CHART REVIEW FOR LAST VISIT AND PROCEED ACCORDINGLY    Last BP was under 140/90 or if patient has diabetes, CAD, or PVD, BP under 130/80 in past year -- IF CRITERIA FAILED REFER TO PROTOCOL DETAILS    Normal Potassium within last 12 months looking at last value -- IF CRITERIA FAILED REFER TO PROTOCOL DETAILS    Normal Sodium within last 12 months looking at last value    Medication (including dose and sig) on current meds list    eGFR greater than 49 within last 12 months looking at last value                  Ivan

## 2024-06-07 NOTE — PATIENT PROFILE ADULT - FUNCTIONAL SCREEN CURRENT LEVEL: SWALLOWING (IF SCORE 2 OR MORE FOR ANY ITEM, CONSULT REHAB SERVICES), MLM)
as per sister pt was playing soccer and fell onto his left arm last week and pt is c/o left arm, pain. 2 = difficulty swallowing liquids/foods